# Patient Record
Sex: MALE | Race: BLACK OR AFRICAN AMERICAN | NOT HISPANIC OR LATINO | URBAN - METROPOLITAN AREA
[De-identification: names, ages, dates, MRNs, and addresses within clinical notes are randomized per-mention and may not be internally consistent; named-entity substitution may affect disease eponyms.]

---

## 2017-04-17 ENCOUNTER — INPATIENT (INPATIENT)
Facility: HOSPITAL | Age: 82
LOS: 6 days | Discharge: NURSING, REHAB OR LONG-TERM CARE | DRG: 56 | End: 2017-04-24
Attending: HOSPITALIST | Admitting: HOSPITALIST
Payer: MEDICARE

## 2017-04-17 VITALS
HEIGHT: 63 IN | HEART RATE: 116 BPM | RESPIRATION RATE: 18 BRPM | SYSTOLIC BLOOD PRESSURE: 133 MMHG | WEIGHT: 151.9 LBS | TEMPERATURE: 98 F | OXYGEN SATURATION: 96 % | DIASTOLIC BLOOD PRESSURE: 84 MMHG

## 2017-04-17 DIAGNOSIS — R41.82 ALTERED MENTAL STATUS, UNSPECIFIED: ICD-10-CM

## 2017-04-17 LAB
ALBUMIN SERPL ELPH-MCNC: 4.1 G/DL — SIGNIFICANT CHANGE UP (ref 3.3–5.2)
ALP SERPL-CCNC: 101 U/L — SIGNIFICANT CHANGE UP (ref 40–120)
ALT FLD-CCNC: 18 U/L — SIGNIFICANT CHANGE UP
ANION GAP SERPL CALC-SCNC: 18 MMOL/L — HIGH (ref 5–17)
APPEARANCE UR: CLEAR — SIGNIFICANT CHANGE UP
AST SERPL-CCNC: 21 U/L — SIGNIFICANT CHANGE UP
BACTERIA # UR AUTO: ABNORMAL
BASOPHILS # BLD AUTO: 0 K/UL — SIGNIFICANT CHANGE UP (ref 0–0.2)
BASOPHILS NFR BLD AUTO: 0 % — SIGNIFICANT CHANGE UP (ref 0–2)
BILIRUB SERPL-MCNC: 1.1 MG/DL — SIGNIFICANT CHANGE UP (ref 0.4–2)
BILIRUB UR-MCNC: NEGATIVE — SIGNIFICANT CHANGE UP
BUN SERPL-MCNC: 39 MG/DL — HIGH (ref 8–20)
CALCIUM SERPL-MCNC: 9.6 MG/DL — SIGNIFICANT CHANGE UP (ref 8.6–10.2)
CHLORIDE SERPL-SCNC: 94 MMOL/L — LOW (ref 98–107)
CO2 SERPL-SCNC: 23 MMOL/L — SIGNIFICANT CHANGE UP (ref 22–29)
COLOR SPEC: YELLOW — SIGNIFICANT CHANGE UP
CREAT SERPL-MCNC: 1.48 MG/DL — HIGH (ref 0.5–1.3)
DIFF PNL FLD: ABNORMAL
EOSINOPHIL # BLD AUTO: 0.2 K/UL — SIGNIFICANT CHANGE UP (ref 0–0.5)
EOSINOPHIL NFR BLD AUTO: 1 % — SIGNIFICANT CHANGE UP (ref 0–6)
EPI CELLS # UR: SIGNIFICANT CHANGE UP
GLUCOSE SERPL-MCNC: 217 MG/DL — HIGH (ref 70–115)
GLUCOSE UR QL: 1000 MG/DL
HCT VFR BLD CALC: 40.8 % — LOW (ref 42–52)
HGB BLD-MCNC: 14.9 G/DL — SIGNIFICANT CHANGE UP (ref 14–18)
KETONES UR-MCNC: ABNORMAL
LACTATE SERPL-SCNC: 1.4 MMOL/L — SIGNIFICANT CHANGE UP (ref 0.5–2)
LEUKOCYTE ESTERASE UR-ACNC: NEGATIVE — SIGNIFICANT CHANGE UP
LYMPHOCYTES # BLD AUTO: 3.4 K/UL — SIGNIFICANT CHANGE UP (ref 1–4.8)
LYMPHOCYTES # BLD AUTO: 30 % — SIGNIFICANT CHANGE UP (ref 20–55)
MCHC RBC-ENTMCNC: 30.7 PG — SIGNIFICANT CHANGE UP (ref 27–31)
MCHC RBC-ENTMCNC: 36.5 G/DL — HIGH (ref 32–36)
MCV RBC AUTO: 84.1 FL — SIGNIFICANT CHANGE UP (ref 80–94)
MONOCYTES # BLD AUTO: 0.8 K/UL — SIGNIFICANT CHANGE UP (ref 0–0.8)
MONOCYTES NFR BLD AUTO: 10 % — SIGNIFICANT CHANGE UP (ref 3–10)
NEUTROPHILS # BLD AUTO: 7.3 K/UL — SIGNIFICANT CHANGE UP (ref 1.8–8)
NEUTROPHILS NFR BLD AUTO: 59 % — SIGNIFICANT CHANGE UP (ref 37–73)
NITRITE UR-MCNC: NEGATIVE — SIGNIFICANT CHANGE UP
NT-PROBNP SERPL-SCNC: 21 PG/ML — SIGNIFICANT CHANGE UP (ref 0–300)
PH UR: 5 — SIGNIFICANT CHANGE UP (ref 4.8–8)
PLATELET # BLD AUTO: 317 K/UL — SIGNIFICANT CHANGE UP (ref 150–400)
POTASSIUM SERPL-MCNC: 5.8 MMOL/L — HIGH (ref 3.5–5.3)
POTASSIUM SERPL-SCNC: 5.8 MMOL/L — HIGH (ref 3.5–5.3)
PROT SERPL-MCNC: 8.6 G/DL — SIGNIFICANT CHANGE UP (ref 6.6–8.7)
PROT UR-MCNC: 15 MG/DL
RBC # BLD: 4.85 M/UL — SIGNIFICANT CHANGE UP (ref 4.6–6.2)
RBC # FLD: 13.2 % — SIGNIFICANT CHANGE UP (ref 11–15.6)
RBC CASTS # UR COMP ASSIST: ABNORMAL /HPF (ref 0–4)
SODIUM SERPL-SCNC: 135 MMOL/L — SIGNIFICANT CHANGE UP (ref 135–145)
SP GR SPEC: 1.01 — SIGNIFICANT CHANGE UP (ref 1.01–1.02)
TSH SERPL-MCNC: 0.79 UIU/ML — SIGNIFICANT CHANGE UP (ref 0.27–4.2)
UROBILINOGEN FLD QL: NEGATIVE MG/DL — SIGNIFICANT CHANGE UP
WBC # BLD: 11.8 K/UL — HIGH (ref 4.8–10.8)
WBC # FLD AUTO: 11.8 K/UL — HIGH (ref 4.8–10.8)
WBC UR QL: SIGNIFICANT CHANGE UP

## 2017-04-17 PROCEDURE — 71010: CPT | Mod: 26

## 2017-04-17 PROCEDURE — 70450 CT HEAD/BRAIN W/O DYE: CPT | Mod: 26

## 2017-04-17 PROCEDURE — 93010 ELECTROCARDIOGRAM REPORT: CPT

## 2017-04-17 PROCEDURE — 99223 1ST HOSP IP/OBS HIGH 75: CPT

## 2017-04-17 PROCEDURE — 99285 EMERGENCY DEPT VISIT HI MDM: CPT

## 2017-04-17 RX ORDER — RISPERIDONE 4 MG/1
1 TABLET ORAL
Qty: 0 | Refills: 0 | COMMUNITY

## 2017-04-17 RX ORDER — DEXTROSE 50 % IN WATER 50 %
12.5 SYRINGE (ML) INTRAVENOUS ONCE
Qty: 0 | Refills: 0 | Status: DISCONTINUED | OUTPATIENT
Start: 2017-04-17 | End: 2017-04-24

## 2017-04-17 RX ORDER — METFORMIN HYDROCHLORIDE 850 MG/1
1 TABLET ORAL
Qty: 0 | Refills: 0 | COMMUNITY

## 2017-04-17 RX ORDER — AMLODIPINE BESYLATE 2.5 MG/1
10 TABLET ORAL DAILY
Qty: 0 | Refills: 0 | Status: DISCONTINUED | OUTPATIENT
Start: 2017-04-17 | End: 2017-04-24

## 2017-04-17 RX ORDER — DEXTROSE 50 % IN WATER 50 %
25 SYRINGE (ML) INTRAVENOUS ONCE
Qty: 0 | Refills: 0 | Status: DISCONTINUED | OUTPATIENT
Start: 2017-04-17 | End: 2017-04-24

## 2017-04-17 RX ORDER — ASPIRIN/CALCIUM CARB/MAGNESIUM 324 MG
325 TABLET ORAL DAILY
Qty: 0 | Refills: 0 | Status: DISCONTINUED | OUTPATIENT
Start: 2017-04-17 | End: 2017-04-24

## 2017-04-17 RX ORDER — DONEPEZIL HYDROCHLORIDE 10 MG/1
10 TABLET, FILM COATED ORAL AT BEDTIME
Qty: 0 | Refills: 0 | Status: DISCONTINUED | OUTPATIENT
Start: 2017-04-17 | End: 2017-04-24

## 2017-04-17 RX ORDER — RISPERIDONE 4 MG/1
0.5 TABLET ORAL DAILY
Qty: 0 | Refills: 0 | Status: DISCONTINUED | OUTPATIENT
Start: 2017-04-17 | End: 2017-04-18

## 2017-04-17 RX ORDER — ATORVASTATIN CALCIUM 80 MG/1
1 TABLET, FILM COATED ORAL
Qty: 0 | Refills: 0 | COMMUNITY

## 2017-04-17 RX ORDER — SODIUM CHLORIDE 9 MG/ML
1000 INJECTION, SOLUTION INTRAVENOUS
Qty: 0 | Refills: 0 | Status: DISCONTINUED | OUTPATIENT
Start: 2017-04-17 | End: 2017-04-24

## 2017-04-17 RX ORDER — SODIUM CHLORIDE 9 MG/ML
1000 INJECTION INTRAMUSCULAR; INTRAVENOUS; SUBCUTANEOUS ONCE
Qty: 0 | Refills: 0 | Status: COMPLETED | OUTPATIENT
Start: 2017-04-17 | End: 2017-04-17

## 2017-04-17 RX ORDER — SODIUM CHLORIDE 9 MG/ML
1000 INJECTION INTRAMUSCULAR; INTRAVENOUS; SUBCUTANEOUS
Qty: 0 | Refills: 0 | Status: DISCONTINUED | OUTPATIENT
Start: 2017-04-17 | End: 2017-04-19

## 2017-04-17 RX ORDER — DONEPEZIL HYDROCHLORIDE 10 MG/1
1 TABLET, FILM COATED ORAL
Qty: 0 | Refills: 0 | COMMUNITY

## 2017-04-17 RX ORDER — TIMOLOL 0.5 %
1 DROPS OPHTHALMIC (EYE) EVERY 12 HOURS
Qty: 0 | Refills: 0 | Status: DISCONTINUED | OUTPATIENT
Start: 2017-04-17 | End: 2017-04-24

## 2017-04-17 RX ORDER — ASPIRIN/CALCIUM CARB/MAGNESIUM 324 MG
1 TABLET ORAL
Qty: 0 | Refills: 0 | COMMUNITY

## 2017-04-17 RX ORDER — AMLODIPINE BESYLATE 2.5 MG/1
1 TABLET ORAL
Qty: 0 | Refills: 0 | COMMUNITY

## 2017-04-17 RX ORDER — INSULIN LISPRO 100/ML
VIAL (ML) SUBCUTANEOUS
Qty: 0 | Refills: 0 | Status: DISCONTINUED | OUTPATIENT
Start: 2017-04-17 | End: 2017-04-24

## 2017-04-17 RX ORDER — DEXTROSE 50 % IN WATER 50 %
1 SYRINGE (ML) INTRAVENOUS ONCE
Qty: 0 | Refills: 0 | Status: DISCONTINUED | OUTPATIENT
Start: 2017-04-17 | End: 2017-04-24

## 2017-04-17 RX ORDER — LISINOPRIL 2.5 MG/1
1 TABLET ORAL
Qty: 0 | Refills: 0 | COMMUNITY

## 2017-04-17 RX ORDER — BRIMONIDINE TARTRATE, TIMOLOL MALEATE 2; 5 MG/ML; MG/ML
1 SOLUTION/ DROPS OPHTHALMIC
Qty: 0 | Refills: 0 | COMMUNITY

## 2017-04-17 RX ORDER — ATORVASTATIN CALCIUM 80 MG/1
20 TABLET, FILM COATED ORAL AT BEDTIME
Qty: 0 | Refills: 0 | Status: DISCONTINUED | OUTPATIENT
Start: 2017-04-17 | End: 2017-04-24

## 2017-04-17 RX ORDER — HEPARIN SODIUM 5000 [USP'U]/ML
5000 INJECTION INTRAVENOUS; SUBCUTANEOUS EVERY 8 HOURS
Qty: 0 | Refills: 0 | Status: DISCONTINUED | OUTPATIENT
Start: 2017-04-17 | End: 2017-04-24

## 2017-04-17 RX ORDER — GLUCAGON INJECTION, SOLUTION 0.5 MG/.1ML
1 INJECTION, SOLUTION SUBCUTANEOUS ONCE
Qty: 0 | Refills: 0 | Status: DISCONTINUED | OUTPATIENT
Start: 2017-04-17 | End: 2017-04-24

## 2017-04-17 RX ORDER — BRIMONIDINE TARTRATE 2 MG/MG
1 SOLUTION/ DROPS OPHTHALMIC EVERY 12 HOURS
Qty: 0 | Refills: 0 | Status: DISCONTINUED | OUTPATIENT
Start: 2017-04-17 | End: 2017-04-24

## 2017-04-17 RX ADMIN — SODIUM CHLORIDE 1000 MILLILITER(S): 9 INJECTION INTRAMUSCULAR; INTRAVENOUS; SUBCUTANEOUS at 19:10

## 2017-04-17 RX ADMIN — ATORVASTATIN CALCIUM 20 MILLIGRAM(S): 80 TABLET, FILM COATED ORAL at 22:16

## 2017-04-17 RX ADMIN — BRIMONIDINE TARTRATE 1 DROP(S): 2 SOLUTION/ DROPS OPHTHALMIC at 22:17

## 2017-04-17 RX ADMIN — HEPARIN SODIUM 5000 UNIT(S): 5000 INJECTION INTRAVENOUS; SUBCUTANEOUS at 22:16

## 2017-04-17 RX ADMIN — Medication 1 DROP(S): at 22:18

## 2017-04-17 RX ADMIN — Medication 3: at 22:17

## 2017-04-17 RX ADMIN — DONEPEZIL HYDROCHLORIDE 10 MILLIGRAM(S): 10 TABLET, FILM COATED ORAL at 22:16

## 2017-04-17 RX ADMIN — RISPERIDONE 0.5 MILLIGRAM(S): 4 TABLET ORAL at 22:16

## 2017-04-17 NOTE — H&P ADULT - NSHPSOCIALHISTORY_GEN_ALL_CORE
Retired. Worked in [employer].  Lives with son.  Former smoker - quit 15 years ago.  Stopped drinking alcohol 15 years ago.

## 2017-04-17 NOTE — ED PROVIDER NOTE - OBJECTIVE STATEMENT
The patient is a 81 year old male presenting to ED for worsening of mental status as per his son.  The patient is now hallucinating seeing his former  friends. No HA, No fever, No CP, NO SOB, No ABD pain, No motor nor sensory loss.

## 2017-04-17 NOTE — H&P ADULT - ASSESSMENT
73 years old male with PMH of DM, CVA, HTN and Dementia sent by PMD for evaluation of AMS.    1) Altered Mental State  - No signs of infection. Leukocytosis could be reactive. Cultures are sent. Will hold antibiotics at this time.  - AMS could be secondary to medications vs worsening dementia. Will get Psych eval.  2) Acute Kidney Injury  - Likely secondary to dehydration  - Hold Metformin and Lisinopril. Avoid Nephrotoxic medications.  - IVF  - Repeat labs in am  3) Hyperkalemia  - EKG  - Got IVF. Repeat Potassium.  4) Hematuria  - Repeat UA. will need further work up if persistent hematuria.  5) DM  - Hold Metformin and Glipizide  - Accu checks and RISS  6) CVA  - Continue Aspirin and Lipitor  - Optimal BP control   7) HTN  - Continue Amlodipine. Hold Lisinopril  8) HLD  - Continue Atorvastatin  DVT Prophylaxis -- Heparin 5000 units

## 2017-04-17 NOTE — ED PROVIDER NOTE - CARE PLAN
Principal Discharge DX:	Altered mental status, unspecified  Secondary Diagnosis:	Dementia  Secondary Diagnosis:	Diabetes

## 2017-04-17 NOTE — ED STATDOCS - PROGRESS NOTE DETAILS
82 y/o male, h/o HTN, DM, dementia, presenting w/ AMS x 4 days. Pt's son acting as historian. Denies fever, vomiting, diarrhea. Pt transferred to main ED for further care.

## 2017-04-17 NOTE — H&P ADULT - NSHPLABSRESULTS_GEN_ALL_CORE
LABS:                        14.9   11.8  )-----------( 317      ( 17 Apr 2017 16:48 )             40.8     04-17    x   |  x   |  x   ----------------------------<  x   5.0   |  x   |  x     Ca    9.6      17 Apr 2017 16:48    TPro  8.6  /  Alb  4.1  /  TBili  1.1  /  DBili  x   /  AST  21  /  ALT  18  /  AlkPhos  101  04-17      CARDIAC MARKERS ( 17 Apr 2017 16:48 )  x     / <0.01 ng/mL / x     / x     / x

## 2017-04-17 NOTE — ED PROVIDER NOTE - CHPI ED SYMPTOMS NEG
no dizziness/no vomiting/no loss of consciousness/no nausea/no fever/no numbness/no weakness/no blurred vision

## 2017-04-17 NOTE — H&P ADULT - HISTORY OF PRESENT ILLNESS
73 years old male with PMH of DM, CVA, HTN and Dementia sent by PMD for evaluation of AMS. As per family at bedside, patient has been confused since Friday. Has been talking about old  friends and is having visual hallucinations. He also fell on Saturday. Fall was unwitnessed but son found him on floor around 4 am. As per son, there was no focal deficit so they did not take him to any Urgent Care or ER.  His oral intake and sleep have decreased. Family denies any fever, cough, urinary symptoms or sick contacts.   Patient is alert and awake and denies any headache, speech problem, arm/leg weakness, fever, cough, chest pain, palpitations or urinary symptoms.

## 2017-04-17 NOTE — H&P ADULT - NSHPPHYSICALEXAM_GEN_ALL_CORE
Vital Signs   T(C): 36.6, Max: 36.6 (04-17 @ 13:13)  T(F): 97.9, Max: 97.9 (04-17 @ 13:13)  HR: 90 (90 - 116)  BP: 132/70 (132/70 - 133/84)  RR: 20 (18 - 20)  SpO2: 97% (96% - 97%)  General: Elderly male lying in bed comfortably. No acute distress  HEENT: PERRLA. EOMI. Clear conjunctivae. Dry mucus membrane. Healing small linear laceration on occipital region from recent fall.  Neck: Supple. No JVD. No Thyromegaly   Chest: CTA bilaterally - no wheezing, rales or rhonchi.   Heart: Normal S1 & S2 with RRR.   Abdomen: Soft. Non-tender. Non-distended. + BS  Ext: No pedal edema. No calf tenderness   Neuro: AAO x 2, No focal deficit, CN II-XII grossly WNL and no speech disorder. No Kernig or Brudzinski signs  Skin: Warm and Dry

## 2017-04-18 DIAGNOSIS — R41.82 ALTERED MENTAL STATUS, UNSPECIFIED: ICD-10-CM

## 2017-04-18 DIAGNOSIS — I63.9 CEREBRAL INFARCTION, UNSPECIFIED: ICD-10-CM

## 2017-04-18 DIAGNOSIS — F03.91 UNSPECIFIED DEMENTIA WITH BEHAVIORAL DISTURBANCE: ICD-10-CM

## 2017-04-18 DIAGNOSIS — R41.0 DISORIENTATION, UNSPECIFIED: ICD-10-CM

## 2017-04-18 DIAGNOSIS — F05 DELIRIUM DUE TO KNOWN PHYSIOLOGICAL CONDITION: ICD-10-CM

## 2017-04-18 DIAGNOSIS — I10 ESSENTIAL (PRIMARY) HYPERTENSION: ICD-10-CM

## 2017-04-18 DIAGNOSIS — E11.9 TYPE 2 DIABETES MELLITUS WITHOUT COMPLICATIONS: ICD-10-CM

## 2017-04-18 LAB
CULTURE RESULTS: NO GROWTH — SIGNIFICANT CHANGE UP
RPR SERPL-ACNC: SIGNIFICANT CHANGE UP
SPECIMEN SOURCE: SIGNIFICANT CHANGE UP

## 2017-04-18 PROCEDURE — 93880 EXTRACRANIAL BILAT STUDY: CPT | Mod: 26

## 2017-04-18 PROCEDURE — 99223 1ST HOSP IP/OBS HIGH 75: CPT

## 2017-04-18 RX ORDER — ACETAMINOPHEN 500 MG
650 TABLET ORAL EVERY 6 HOURS
Qty: 0 | Refills: 0 | Status: DISCONTINUED | OUTPATIENT
Start: 2017-04-18 | End: 2017-04-24

## 2017-04-18 RX ORDER — HALOPERIDOL DECANOATE 100 MG/ML
2 INJECTION INTRAMUSCULAR ONCE
Qty: 0 | Refills: 0 | Status: COMPLETED | OUTPATIENT
Start: 2017-04-18 | End: 2017-04-18

## 2017-04-18 RX ORDER — RISPERIDONE 4 MG/1
1 TABLET ORAL
Qty: 0 | Refills: 0 | Status: DISCONTINUED | OUTPATIENT
Start: 2017-04-18 | End: 2017-04-24

## 2017-04-18 RX ORDER — HALOPERIDOL DECANOATE 100 MG/ML
5 INJECTION INTRAMUSCULAR ONCE
Qty: 0 | Refills: 0 | Status: COMPLETED | OUTPATIENT
Start: 2017-04-18 | End: 2017-04-18

## 2017-04-18 RX ORDER — HALOPERIDOL DECANOATE 100 MG/ML
5 INJECTION INTRAMUSCULAR EVERY 6 HOURS
Qty: 0 | Refills: 0 | Status: DISCONTINUED | OUTPATIENT
Start: 2017-04-18 | End: 2017-04-24

## 2017-04-18 RX ADMIN — Medication 2: at 21:47

## 2017-04-18 RX ADMIN — SODIUM CHLORIDE 125 MILLILITER(S): 9 INJECTION INTRAMUSCULAR; INTRAVENOUS; SUBCUTANEOUS at 05:41

## 2017-04-18 RX ADMIN — RISPERIDONE 1 MILLIGRAM(S): 4 TABLET ORAL at 06:32

## 2017-04-18 RX ADMIN — ATORVASTATIN CALCIUM 20 MILLIGRAM(S): 80 TABLET, FILM COATED ORAL at 21:29

## 2017-04-18 RX ADMIN — HALOPERIDOL DECANOATE 5 MILLIGRAM(S): 100 INJECTION INTRAMUSCULAR at 13:10

## 2017-04-18 RX ADMIN — Medication 650 MILLIGRAM(S): at 01:03

## 2017-04-18 RX ADMIN — BRIMONIDINE TARTRATE 1 DROP(S): 2 SOLUTION/ DROPS OPHTHALMIC at 16:46

## 2017-04-18 RX ADMIN — HALOPERIDOL DECANOATE 5 MILLIGRAM(S): 100 INJECTION INTRAMUSCULAR at 08:21

## 2017-04-18 RX ADMIN — HALOPERIDOL DECANOATE 2 MILLIGRAM(S): 100 INJECTION INTRAMUSCULAR at 06:32

## 2017-04-18 RX ADMIN — HALOPERIDOL DECANOATE 5 MILLIGRAM(S): 100 INJECTION INTRAMUSCULAR at 09:32

## 2017-04-18 RX ADMIN — HEPARIN SODIUM 5000 UNIT(S): 5000 INJECTION INTRAVENOUS; SUBCUTANEOUS at 21:29

## 2017-04-18 RX ADMIN — AMLODIPINE BESYLATE 10 MILLIGRAM(S): 2.5 TABLET ORAL at 05:42

## 2017-04-18 RX ADMIN — Medication 1 DROP(S): at 16:47

## 2017-04-18 RX ADMIN — Medication 1 DROP(S): at 05:42

## 2017-04-18 RX ADMIN — DONEPEZIL HYDROCHLORIDE 10 MILLIGRAM(S): 10 TABLET, FILM COATED ORAL at 21:28

## 2017-04-18 RX ADMIN — HALOPERIDOL DECANOATE 5 MILLIGRAM(S): 100 INJECTION INTRAMUSCULAR at 22:23

## 2017-04-18 RX ADMIN — Medication 2: at 12:25

## 2017-04-18 RX ADMIN — Medication 650 MILLIGRAM(S): at 01:45

## 2017-04-18 RX ADMIN — HEPARIN SODIUM 5000 UNIT(S): 5000 INJECTION INTRAVENOUS; SUBCUTANEOUS at 05:42

## 2017-04-18 RX ADMIN — BRIMONIDINE TARTRATE 1 DROP(S): 2 SOLUTION/ DROPS OPHTHALMIC at 05:42

## 2017-04-18 NOTE — PROGRESS NOTE ADULT - SUBJECTIVE AND OBJECTIVE BOX
HPI:  73 years old male with PMH of DM, CVA, HTN and Dementia sent by PMD for evaluation of AMS. As per family at bedside, patient has been confused since Friday. Has been talking about old  friends and is having visual hallucinations. He also fell on Saturday. Fall was unwitnessed but son found him on floor around 4 am. As per son, there was no focal deficit so they did not take him to any Urgent Care or ER.  His oral intake and sleep have decreased. Family denies any fever, cough, urinary symptoms or sick contacts.   Patient is alert and awake and denies any headache, speech problem, arm/leg weakness, fever, cough, chest pain, palpitations or urinary symptoms. (2017 21:48)    Male  PAST MEDICAL & SURGICAL HISTORY:  CVA (cerebral vascular accident)  Hyperlipemia  Diabetes  Hypertension  Dementia  No significant past surgical history      REVIEW OF SYSTEMS      General:	obesity    Skin/Breast: normal  	  Ophthalmologic: refractive disorder, diabetic retinopathy.  	  ENMT:	normal    Respiratory and Thorax:normal  	  Cardiovascular:	hypertension,     Gastrointestinal:	normal    Genitourinary:	BPH.    Musculoskeletal:  normal	    Neurological: Old righ CVA, with encephalomalacia. 	    Psychiatric:  normal 	    Hematology/Lymphatics:	normal    Endocrine:	obesity, diabetes, hyperlipidemia     Allergic/Immunologic:	none.    MEDICATIONS  (STANDING):  insulin lispro (HumaLOG) corrective regimen sliding scale  SubCutaneous Before meals and at bedtime  dextrose 5%. 1000milliLiter(s) IV Continuous <Continuous>  dextrose 50% Injectable 12.5Gram(s) IV Push once  dextrose 50% Injectable 25Gram(s) IV Push once  dextrose 50% Injectable 25Gram(s) IV Push once  sodium chloride 0.9%. 1000milliLiter(s) IV Continuous <Continuous>  aspirin 325milliGRAM(s) Oral daily  atorvastatin 20milliGRAM(s) Oral at bedtime  donepezil 10milliGRAM(s) Oral at bedtime  amLODIPine   Tablet 10milliGRAM(s) Oral daily  heparin  Injectable 5000Unit(s) SubCutaneous every 8 hours  timolol 0.5% Solution 1Drop(s) Both EYES every 12 hours  brimonidine 0.2% Ophthalmic Solution 1Drop(s) Both EYES every 12 hours  risperiDONE   Tablet 1milliGRAM(s) Oral two times a day    MEDICATIONS  (PRN):  dextrose Gel 1Dose(s) Oral once PRN Blood Glucose LESS THAN 70 milliGRAM(s)/deciLiter  glucagon  Injectable 1milliGRAM(s) IntraMuscular once PRN Glucose <70 milliGRAM(s)/deciLiter  acetaminophen   Tablet. 650milliGRAM(s) Oral every 6 hours PRN Headache or Bodyache  haloperidol    Injectable 5milliGRAM(s) IntraMuscular every 6 hours PRN Agitation      Allergies    No Known Allergies    Intolerances        SOCIAL HISTORY:    FAMILY HISTORY:  No pertinent family history in first degree relatives      Vital Signs Last 24 Hrs  T(C): 37.4, Max: 37.4 (04-18 @ 19:16)  T(F): 99.3, Max: 99.3 (04-18 @ 19:16)  HR: 124 (89 - 124)  BP: 140/65 (120/79 - 153/84)  BP(mean): --  RR: 20 (19 - 24)  SpO2: 98% (98% - 100%)    PHYSICAL EXAM:      Constitutional: obesity, well developed,  confused  and with hallucination, aversive behavior.     Eyes: refraction disorder +  diabetic  retinopathy.    ENMT:  normal     Neck:  no bruits,  neck veins flat.    Breasts:  normal     Back:  normal     Respiratory: normal    Cardiovascular: regular rhythm, no murmur    Gastrointestinal:  normal     Genitourinary: BPH.    Rectal:    Extremities: no edema,     Vascular:  normal     Neurological: no neuro deficits    Skin:  normal     Lymph Nodes:  normal     Musculoskeletal: normal     Psychiatric:  hallucination, delirium on dementia. agitation, aggressive  behavior.        LABS:  CBC Full  -  ( 2017 16:48 )  WBC Count : 11.8 K/uL  Hemoglobin : 14.9 g/dL  Hematocrit : 40.8 %  Platelet Count - Automated : 317 K/uL  Mean Cell Volume : 84.1 fl  Mean Cell Hemoglobin : 30.7 pg  Mean Cell Hemoglobin Concentration : 36.5 g/dL  Auto Neutrophil # : 7.3 K/uL  Auto Lymphocyte # : 3.4 K/uL  Auto Monocyte # : 0.8 K/uL  Auto Eosinophil # : 0.2 K/uL  Auto Basophil # : 0.0 K/uL  Auto Neutrophil % : 59.0 %  Auto Lymphocyte % : 30.0 %  Auto Monocyte % : 10.0 %  Auto Eosinophil % : 1.0 %  Auto Basophil % : 0.0 %    04-17    x   |  x   |  x   ----------------------------<  x   5.0   |  x   |  x     Ca    9.6      2017 16:48    TPro  8.6  /  Alb  4.1  /  TBili  1.1  /  DBili  x   /  AST  21  /  ALT  18  /  AlkPhos  101  04-17      Urinalysis Basic - ( 2017 21:29 )    Color: Yellow / Appearance: Clear / S.015 / pH: x  Gluc: x / Ketone: Moderate  / Bili: Negative / Urobili: Negative mg/dL   Blood: x / Protein: 15 mg/dL / Nitrite: Negative   Leuk Esterase: Negative / RBC: 6-10 /HPF / WBC 0-2   Sq Epi: x / Non Sq Epi: Few / Bacteria: Few        RADIOLOGY & ADDITIONAL STUDIES:  CT SCAN of the brain, old rigt CVA, no additional patholgy.  ECHO LVH

## 2017-04-18 NOTE — CONSULT NOTE ADULT - SUBJECTIVE AND OBJECTIVE BOX
AMS    73M with AMS, confusion, hx of CVA, R/O syncope.  Pt is unable to give hx at this time    MEDICATIONS  (STANDING):  insulin lispro (HumaLOG) corrective regimen sliding scale  SubCutaneous Before meals and at bedtime  dextrose 5%. 1000milliLiter(s) IV Continuous <Continuous>  dextrose 50% Injectable 12.5Gram(s) IV Push once  dextrose 50% Injectable 25Gram(s) IV Push once  dextrose 50% Injectable 25Gram(s) IV Push once  sodium chloride 0.9%. 1000milliLiter(s) IV Continuous <Continuous>  aspirin 325milliGRAM(s) Oral daily  atorvastatin 20milliGRAM(s) Oral at bedtime  donepezil 10milliGRAM(s) Oral at bedtime  amLODIPine   Tablet 10milliGRAM(s) Oral daily  heparin  Injectable 5000Unit(s) SubCutaneous every 8 hours  timolol 0.5% Solution 1Drop(s) Both EYES every 12 hours  brimonidine 0.2% Ophthalmic Solution 1Drop(s) Both EYES every 12 hours  risperiDONE   Tablet 1milliGRAM(s) Oral two times a day    MEDICATIONS  (PRN):  dextrose Gel 1Dose(s) Oral once PRN Blood Glucose LESS THAN 70 milliGRAM(s)/deciLiter  glucagon  Injectable 1milliGRAM(s) IntraMuscular once PRN Glucose <70 milliGRAM(s)/deciLiter  acetaminophen   Tablet. 650milliGRAM(s) Oral every 6 hours PRN Headache or Bodyache  haloperidol    Injectable 5milliGRAM(s) IntraMuscular every 6 hours PRN Agitation  	    Vital Signs Last 24 Hrs  T(C): 36.3, Max: 36.8 (-18 @ 02:51)  T(F): 97.3, Max: 98.2 (04-18 @ 02:51)  HR: 106 (89 - 116)  BP: 153/84 (132/70 - 153/84)  BP(mean): --  RR: 24 (18 - 24)  SpO2: 98% (96% - 98%)    Daily Height in cm: 160.02 (2017 13:13)    Daily     I&O's Detail      PHYSICAL EXAM:  Appearance: Normal, well nourished		  Cardiovascular: Normal S1 S2, No JVD, No cardiac murmurs, No carotid bruits, No peripheral edema  Respiratory: Lungs clear to auscultation	  Gastrointestinal:  Soft, Non-tender, + BS, no bruits	  Skin: No rashes, No ecchymoses, No cyanosis  Neurologic: right arm weakness  Extremities:No clubbing, cyanosis or edema  Vascular: Peripheral pulses palpable 2+ bilaterally      INTERPRETATION OF TELEMETRY:    ECG: SR no acute st/t abn    LABS:                        14.9   11.8  )-----------( 317      ( 2017 16:48 )             40.8         x   |  x   |  x   ----------------------------<  x   5.0   |  x   |  x     Ca    9.6      2017 16:48    TPro  8.6  /  Alb  4.1  /  TBili  1.1  /  DBili  x   /  AST  21  /  ALT  18  /  AlkPhos  101  -    CARDIAC MARKERS ( 2017 16:48 )  x     / <0.01 ng/mL / x     / x     / x            Urinalysis Basic - ( 2017 21:29 )    Color: Yellow / Appearance: Clear / S.015 / pH: x  Gluc: x / Ketone: Moderate  / Bili: Negative / Urobili: Negative mg/dL   Blood: x / Protein: 15 mg/dL / Nitrite: Negative   Leuk Esterase: Negative / RBC: 6-10 /HPF / WBC 0-2   Sq Epi: x / Non Sq Epi: Few / Bacteria: Few      I&O's Summary    BNPSerum Pro-Brain Natriuretic Peptide: 21 pg/mL ( @ 16:48)    RADIOLOGY & ADDITIONAL STUDIES:    Impression.  AMS, etiology unclear.  Hx of CVA, R/O syncope. CT of head is c/w old infarct.   Suggest neuro eval, tele, Echo  Will follow, thank you.

## 2017-04-18 NOTE — CONSULT NOTE ADULT - SUBJECTIVE AND OBJECTIVE BOX
CHIEF COMPLAINT: altered mentation    HPI: 81yMale  73 years old male with PMH of DM, CVA, HTN and Dementia sent by PMD for evaluation of AMS. As per family at bedside, patient has been confused since Friday. Has been talking about old  friends and is having visual hallucinations. He also fell on Saturday. Fall was unwitnessed but son found him on floor around 4 am. As per son, there was no focal deficit so they did not take him to any Urgent Care or ER.  His oral intake and sleep have decreased. Family denies any fever, cough, urinary symptoms or sick contacts.   Patient is alert and awake and denies any headache, speech problem, arm/leg weakness, fever, cough, chest pain, palpitations or urinary symptoms.    Patient speaks limited English. Speaking Creole.         PAST MEDICAL & SURGICAL HISTORY:  CVA (cerebral vascular accident)  Hyperlipemia  Diabetes  Hypertension  Dementia  No significant past surgical history    MEDICATIONS  (STANDING):  insulin lispro (HumaLOG) corrective regimen sliding scale  SubCutaneous Before meals and at bedtime  dextrose 5%. 1000milliLiter(s) IV Continuous <Continuous>  dextrose 50% Injectable 12.5Gram(s) IV Push once  dextrose 50% Injectable 25Gram(s) IV Push once  dextrose 50% Injectable 25Gram(s) IV Push once  sodium chloride 0.9%. 1000milliLiter(s) IV Continuous <Continuous>  aspirin 325milliGRAM(s) Oral daily  atorvastatin 20milliGRAM(s) Oral at bedtime  donepezil 10milliGRAM(s) Oral at bedtime  amLODIPine   Tablet 10milliGRAM(s) Oral daily  heparin  Injectable 5000Unit(s) SubCutaneous every 8 hours  timolol 0.5% Solution 1Drop(s) Both EYES every 12 hours  brimonidine 0.2% Ophthalmic Solution 1Drop(s) Both EYES every 12 hours  risperiDONE   Tablet 1milliGRAM(s) Oral two times a day  haloperidol    Injectable 5milliGRAM(s) IV Push once    MEDICATIONS  (PRN):  dextrose Gel 1Dose(s) Oral once PRN Blood Glucose LESS THAN 70 milliGRAM(s)/deciLiter  glucagon  Injectable 1milliGRAM(s) IntraMuscular once PRN Glucose <70 milliGRAM(s)/deciLiter  acetaminophen   Tablet. 650milliGRAM(s) Oral every 6 hours PRN Headache or Bodyache  haloperidol    Injectable 5milliGRAM(s) IntraMuscular every 6 hours PRN Agitation    Allergies    No Known Allergies    Intolerances        FAMILY HISTORY:  No pertinent family history in first degree relatives          SOCIAL HISTORY:    Tobacco:  no  Alcohol:  no  Drugs:  no        REVIEW OF SYSTEMS:    Relevant systems are negative except as noted in the chart, HPI, and PMH      VITAL SIGNS:  Vital Signs Last 24 Hrs  T(C): 36.3, Max: 36.8 (04-18 @ 02:51)  T(F): 97.3, Max: 98.2 (04-18 @ 02:51)  HR: 106 (89 - 116)  BP: 153/84 (132/70 - 153/84)  BP(mean): --  RR: 24 (18 - 24)  SpO2: 98% (96% - 98%)    PHYSICAL EXAMINATION:    General: Well-developed, well nourished, in no acute distress.  Cardiac:  Regular rate and rhythm. No carotid bruits appreciated.  Eyes: Fundoscopic examination was deferred.  Neurologic:  - Mental Status:  Alert, awake, Agitated/variably cooperative. Speaking Creole but follow some commands in English-variable  Cranial Nerves II-XII:    II:   ; Visual fields are full to threat Pupils are equal, round, and reactive to light.  No gross facial asymmetry  - Motor:  Strength- moving all 4 limbs but seems to move the left side less.   - Reflexes:  2+   knees.  Plantar responses flexor on right . Nonreactive on left  -     LABS:                          14.9   11.8  )-----------( 317      ( 17 Apr 2017 16:48 )             40.8     17 Apr 2017 22:38    x      |  x      |  x      ----------------------------<  x      5.0     |  x      |  x        Ca    9.6        17 Apr 2017 16:48    TPro  8.6    /  Alb  4.1    /  TBili  1.1    /  DBili  x      /  AST  21     /  ALT  18     /  AlkPhos  101    17 Apr 2017 16:48    LIVER FUNCTIONS - ( 17 Apr 2017 16:48 )  Alb: 4.1 g/dL / Pro: 8.6 g/dL / ALK PHOS: 101 U/L / ALT: 18 U/L / AST: 21 U/L / GGT: x                 RADIOLOGY & ADDITIONAL STUDIES:      CT 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 No acute changes    IMPRESSION:    encephalopathy- some metabolic derangments, r/o infections  h/o dementia  Exam suggestive of left side weakness. CT old stroke. Multiple risk factors for cerebra infarct- r/o CVA    PLAN:  1. Stroke protocols  2. Medical managment  3. Vascular risk facots assessment and interventions  4. MRI, vascular imaging,  Cardiac eval  5.Dispo  6. Dementia eval- labs, Aricept

## 2017-04-18 NOTE — PROGRESS NOTE ADULT - ASSESSMENT
acute delirium on chronic  dementia.  old cva, no  indication of new stroke, possible encephalopathy

## 2017-04-18 NOTE — BEHAVIORAL HEALTH ASSESSMENT NOTE - HPI (INCLUDE ILLNESS QUALITY, SEVERITY, DURATION, TIMING, CONTEXT, MODIFYING FACTORS, ASSOCIATED SIGNS AND SYMPTOMS)
80 yo male w/hx of dementia for 2 yrs, no psychiatric hospitalizations or other issues, pmh DM, CVA, HTN, lives with son, no abuse/trauma/legal hx/substance abuse, BIB EMS for acute AMS with confusion that started 5 days ago. The patient is confused, a&ox1, with poor concentration, in posey vest, had rec'd haldol 12mg IM between 7am and 9am with minimal effect. Son reports that patient has had poor intake of food/drink for the past few week due to decreased appetite. He first noticed worsening confusion last thursday but denies any agitation. The patient has been on risperidone 1mg bid for 2 years. He was found to have metabolic irregularities including elevated BUN and creatinine. IV fluids have been ordered but this patient continues to pull at/out IV. Also, refusing insulin and other medications.

## 2017-04-19 DIAGNOSIS — N17.9 ACUTE KIDNEY FAILURE, UNSPECIFIED: ICD-10-CM

## 2017-04-19 DIAGNOSIS — I10 ESSENTIAL (PRIMARY) HYPERTENSION: ICD-10-CM

## 2017-04-19 LAB
ANION GAP SERPL CALC-SCNC: 13 MMOL/L — SIGNIFICANT CHANGE UP (ref 5–17)
B BURGDOR C6 AB SER-ACNC: NEGATIVE — SIGNIFICANT CHANGE UP
B BURGDOR IGG+IGM SER-ACNC: 0.02 INDEX — SIGNIFICANT CHANGE UP (ref 0.01–0.89)
BUN SERPL-MCNC: 30 MG/DL — HIGH (ref 8–20)
CALCIUM SERPL-MCNC: 9.5 MG/DL — SIGNIFICANT CHANGE UP (ref 8.6–10.2)
CHLORIDE SERPL-SCNC: 96 MMOL/L — LOW (ref 98–107)
CHOLEST SERPL-MCNC: 113 MG/DL — SIGNIFICANT CHANGE UP (ref 110–199)
CO2 SERPL-SCNC: 23 MMOL/L — SIGNIFICANT CHANGE UP (ref 22–29)
CREAT SERPL-MCNC: 0.8 MG/DL — SIGNIFICANT CHANGE UP (ref 0.5–1.3)
GLUCOSE SERPL-MCNC: 193 MG/DL — HIGH (ref 70–115)
HBA1C BLD-MCNC: 10.2 % — HIGH (ref 4–5.6)
HCT VFR BLD CALC: 38.4 % — LOW (ref 42–52)
HDLC SERPL-MCNC: 57 MG/DL — SIGNIFICANT CHANGE UP
HGB BLD-MCNC: 14.1 G/DL — SIGNIFICANT CHANGE UP (ref 14–18)
LIPID PNL WITH DIRECT LDL SERPL: 38 MG/DL — SIGNIFICANT CHANGE UP
MAGNESIUM SERPL-MCNC: 2 MG/DL — SIGNIFICANT CHANGE UP (ref 1.8–2.5)
MCHC RBC-ENTMCNC: 30.5 PG — SIGNIFICANT CHANGE UP (ref 27–31)
MCHC RBC-ENTMCNC: 36.7 G/DL — HIGH (ref 32–36)
MCV RBC AUTO: 83.1 FL — SIGNIFICANT CHANGE UP (ref 80–94)
PHOSPHATE SERPL-MCNC: 3.3 MG/DL — SIGNIFICANT CHANGE UP (ref 2.4–4.7)
PLATELET # BLD AUTO: 254 K/UL — SIGNIFICANT CHANGE UP (ref 150–400)
POTASSIUM SERPL-MCNC: 4.7 MMOL/L — SIGNIFICANT CHANGE UP (ref 3.5–5.3)
POTASSIUM SERPL-SCNC: 4.7 MMOL/L — SIGNIFICANT CHANGE UP (ref 3.5–5.3)
RBC # BLD: 4.62 M/UL — SIGNIFICANT CHANGE UP (ref 4.6–6.2)
RBC # FLD: 13.1 % — SIGNIFICANT CHANGE UP (ref 11–15.6)
SODIUM SERPL-SCNC: 132 MMOL/L — LOW (ref 135–145)
TOTAL CHOLESTEROL/HDL RATIO MEASUREMENT: 2 RATIO — LOW (ref 3.4–9.6)
TRIGL SERPL-MCNC: 88 MG/DL — SIGNIFICANT CHANGE UP (ref 10–200)
WBC # BLD: 14.3 K/UL — HIGH (ref 4.8–10.8)
WBC # FLD AUTO: 14.3 K/UL — HIGH (ref 4.8–10.8)

## 2017-04-19 PROCEDURE — 99233 SBSQ HOSP IP/OBS HIGH 50: CPT

## 2017-04-19 PROCEDURE — 70551 MRI BRAIN STEM W/O DYE: CPT | Mod: 26

## 2017-04-19 RX ADMIN — Medication 1 DROP(S): at 06:04

## 2017-04-19 RX ADMIN — Medication 1: at 22:44

## 2017-04-19 RX ADMIN — Medication 1 DROP(S): at 18:00

## 2017-04-19 RX ADMIN — HEPARIN SODIUM 5000 UNIT(S): 5000 INJECTION INTRAVENOUS; SUBCUTANEOUS at 06:03

## 2017-04-19 RX ADMIN — HEPARIN SODIUM 5000 UNIT(S): 5000 INJECTION INTRAVENOUS; SUBCUTANEOUS at 22:44

## 2017-04-19 RX ADMIN — RISPERIDONE 1 MILLIGRAM(S): 4 TABLET ORAL at 20:05

## 2017-04-19 RX ADMIN — AMLODIPINE BESYLATE 10 MILLIGRAM(S): 2.5 TABLET ORAL at 06:03

## 2017-04-19 RX ADMIN — BRIMONIDINE TARTRATE 1 DROP(S): 2 SOLUTION/ DROPS OPHTHALMIC at 18:00

## 2017-04-19 RX ADMIN — BRIMONIDINE TARTRATE 1 DROP(S): 2 SOLUTION/ DROPS OPHTHALMIC at 06:04

## 2017-04-19 RX ADMIN — Medication 1 MILLIGRAM(S): at 10:19

## 2017-04-19 RX ADMIN — Medication 5: at 12:21

## 2017-04-19 RX ADMIN — HEPARIN SODIUM 5000 UNIT(S): 5000 INJECTION INTRAVENOUS; SUBCUTANEOUS at 12:26

## 2017-04-19 RX ADMIN — RISPERIDONE 1 MILLIGRAM(S): 4 TABLET ORAL at 06:03

## 2017-04-19 NOTE — CONSULT NOTE ADULT - ASSESSMENT
Acute CVA. Hx of CVA, possible LOC.  W/U is negative for etiology thus far.  Echocardiogram in WNL, no significant disease in the Carotid.  Further w/u as per Neuro.  Telemetry.

## 2017-04-19 NOTE — PROGRESS NOTE ADULT - PROBLEM SELECTOR PLAN 3
Hypotensive this afternoon likely secondary to Ativan given prior to MRI.  Continue Norvasc with parameters. Hypotensive this afternoon likely secondary to Ativan given prior to MRI.  Continue Norvasc with parameters.  on ivfs

## 2017-04-19 NOTE — DISCHARGE NOTE ADULT - MEDICATION SUMMARY - MEDICATIONS TO TAKE
I will START or STAY ON the medications listed below when I get home from the hospital:    aspirin 325 mg oral tablet  -- 1 tab(s) by mouth once a day  -- Indication: For Cerebrovascular accident (CVA), unspecified mechanism    lisinopril 10 mg oral tablet  -- 1 tab(s) by mouth once a day  -- Indication: For Essential hypertension    metFORMIN 1000 mg oral tablet  -- 1 tab(s) by mouth 2 times a day  -- Indication: For Type 2 diabetes mellitus with other circulatory complication    glipiZIDE 5 mg oral tablet  -- 3 tab(s) by mouth once a day  -- Indication: For Type 2 diabetes mellitus with other circulatory complication    atorvastatin 20 mg oral tablet  -- 1 tab(s) by mouth once a day  -- Indication: For Hyperlipemia    RisperDAL 0.5 mg oral tablet  -- 1 tab(s) by mouth once a day  -- Indication: For Dementia    amLODIPine 5 mg oral tablet  -- 1 tab(s) by mouth once a day  -- Indication: For Essential hypertension    Aricept 10 mg oral tablet  -- 1 tab(s) by mouth once a day (at bedtime)  -- Indication: For Dementia    Combigan 0.2%-0.5% ophthalmic solution  -- 1 drop(s) to each affected eye every 12 hours  -- Indication: For Eye condition I will START or STAY ON the medications listed below when I get home from the hospital:    aspirin 325 mg oral tablet  -- 1 tab(s) by mouth once a day  -- Indication: For Cerebrovascular accident (CVA), unspecified mechanism    lisinopril 10 mg oral tablet  -- 1 tab(s) by mouth once a day  -- Indication: For Essential hypertension    metFORMIN 1000 mg oral tablet  -- 1 tab(s) by mouth 2 times a day  -- Indication: For Diabetes    glipiZIDE 5 mg oral tablet  -- 3 tab(s) by mouth once a day  -- Indication: For Diabetes    atorvastatin 20 mg oral tablet  -- 1 tab(s) by mouth once a day  -- Indication: For Hyperlipemia    RisperDAL 0.5 mg oral tablet  -- 1 tab(s) by mouth once a day  -- Indication: For Delirium    amLODIPine 5 mg oral tablet  -- 1 tab(s) by mouth once a day  -- Indication: For Essential hypertension    Aricept 10 mg oral tablet  -- 1 tab(s) by mouth once a day (at bedtime)  -- Indication: For Dementia    Combigan 0.2%-0.5% ophthalmic solution  -- 1 drop(s) to each affected eye every 12 hours  -- Indication: For Eye condition

## 2017-04-19 NOTE — DISCHARGE NOTE ADULT - NS AS DC STROKE ED MATERIALS
Stroke Warning Signs and Symptoms/Prescribed Medications/Call 911 for Stroke/Need for Followup After Discharge/Stroke Education Booklet/Risk Factors for Stroke

## 2017-04-19 NOTE — PROGRESS NOTE ADULT - SUBJECTIVE AND OBJECTIVE BOX
INTERVAL HISTORY:  patient denies any issues. Occasionally agitated- needing one to one supervision      VITAL SIGNS:  Vital Signs Last 24 Hrs  T(C): 36.1, Max: 37.4 (04-18 @ 19:16)  T(F): 97, Max: 99.3 (04-18 @ 19:16)  HR: 100 (98 - 124)  BP: 117/79 (117/76 - 169/93)  BP(mean): --  RR: 20 (19 - 20)  SpO2: 98% (98% - 100%)    PHYSICAL EXAMINATION:    Mentation:  awake, fairly cooperative speaking some English  Language/Speech:above  CN: PERRL , perhaps slight rightward deviation of eyes  Visual Fields: full to threat  Motor: moving left side less - (3/5)  Sensory: deferred  DTR:  Babinski: NR      MEDS:  MEDICATIONS  (STANDING):  insulin lispro (HumaLOG) corrective regimen sliding scale  SubCutaneous Before meals and at bedtime  dextrose 5%. 1000milliLiter(s) IV Continuous <Continuous>  dextrose 50% Injectable 12.5Gram(s) IV Push once  dextrose 50% Injectable 25Gram(s) IV Push once  dextrose 50% Injectable 25Gram(s) IV Push once  sodium chloride 0.9%. 1000milliLiter(s) IV Continuous <Continuous>  aspirin 325milliGRAM(s) Oral daily  atorvastatin 20milliGRAM(s) Oral at bedtime  donepezil 10milliGRAM(s) Oral at bedtime  amLODIPine   Tablet 10milliGRAM(s) Oral daily  heparin  Injectable 5000Unit(s) SubCutaneous every 8 hours  timolol 0.5% Solution 1Drop(s) Both EYES every 12 hours  brimonidine 0.2% Ophthalmic Solution 1Drop(s) Both EYES every 12 hours  risperiDONE   Tablet 1milliGRAM(s) Oral two times a day    MEDICATIONS  (PRN):  dextrose Gel 1Dose(s) Oral once PRN Blood Glucose LESS THAN 70 milliGRAM(s)/deciLiter  glucagon  Injectable 1milliGRAM(s) IntraMuscular once PRN Glucose <70 milliGRAM(s)/deciLiter  acetaminophen   Tablet. 650milliGRAM(s) Oral every 6 hours PRN Headache or Bodyache  haloperidol    Injectable 5milliGRAM(s) IntraMuscular every 6 hours PRN Agitation      LABS:                          14.9   11.8  )-----------( 317      ( 17 Apr 2017 16:48 )             40.8     04-17    x   |  x   |  x   ----------------------------<  x   5.0   |  x   |  x     Ca    9.6      17 Apr 2017 16:48    TPro  8.6  /  Alb  4.1  /  TBili  1.1  /  DBili  x   /  AST  21  /  ALT  18  /  AlkPhos  101  04-17    LIVER FUNCTIONS - ( 17 Apr 2017 16:48 )  Alb: 4.1 g/dL / Pro: 8.6 g/dL / ALK PHOS: 101 U/L / ALT: 18 U/L / AST: 21 U/L / GGT: x               RADIOLOGY & ADDITIONAL STUDIES:    duplex- neg  Echo, neg, limited  MRI pending    IMPRESSION & PLAN:   Patient with left side weakness that is more apparent today. Suspect cerebral infarct  Awaiting MRI. He may require sedation. Will try to expedite   Continue stroke protocols  Cerebrovascular risk factor management

## 2017-04-19 NOTE — CONSULT NOTE ADULT - SUBJECTIVE AND OBJECTIVE BOX
81M with altered mental status.  Poor historian.  Denies CP, palpitation, syncope  Hx of CVA        MEDICATIONS  (STANDING):  insulin lispro (HumaLOG) corrective regimen sliding scale  SubCutaneous Before meals and at bedtime  dextrose 5%. 1000milliLiter(s) IV Continuous <Continuous>  dextrose 50% Injectable 12.5Gram(s) IV Push once  dextrose 50% Injectable 25Gram(s) IV Push once  dextrose 50% Injectable 25Gram(s) IV Push once  sodium chloride 0.9%. 1000milliLiter(s) IV Continuous <Continuous>  aspirin 325milliGRAM(s) Oral daily  atorvastatin 20milliGRAM(s) Oral at bedtime  donepezil 10milliGRAM(s) Oral at bedtime  amLODIPine   Tablet 10milliGRAM(s) Oral daily  heparin  Injectable 5000Unit(s) SubCutaneous every 8 hours  timolol 0.5% Solution 1Drop(s) Both EYES every 12 hours  brimonidine 0.2% Ophthalmic Solution 1Drop(s) Both EYES every 12 hours  risperiDONE   Tablet 1milliGRAM(s) Oral two times a day  LORazepam   Injectable 1milliGRAM(s) IV Push once    MEDICATIONS  (PRN):  dextrose Gel 1Dose(s) Oral once PRN Blood Glucose LESS THAN 70 milliGRAM(s)/deciLiter  glucagon  Injectable 1milliGRAM(s) IntraMuscular once PRN Glucose <70 milliGRAM(s)/deciLiter  acetaminophen   Tablet. 650milliGRAM(s) Oral every 6 hours PRN Headache or Bodyache  haloperidol    Injectable 5milliGRAM(s) IntraMuscular every 6 hours PRN Agitation      SOCIAL HISTORY:    CIGARETTES:  former smoker      REVIEW OF SYSTEMS:  CONSTITUTIONAL: No fever, weight loss, or fatigue  EYES: No eye pain, visual disturbances, or discharge  ENMT:  No difficulty hearing, tinnitus, vertigo; No sinus or throat pain  NECK: No pain or stiffness  RESPIRATORY: No cough, wheezing, chills or hemoptysis; No Shortness of Breath  CARDIOVASCULAR: No chest pain, palpitations, passing out, dizziness, or leg swelling  GASTROINTESTINAL: No abdominal or epigastric pain. No nausea, vomiting, or hematemesis; No diarrhea or constipation. No melena or hematochezia.  GENITOURINARY: No dysuria, frequency, hematuria, or incontinence  NEUROLOGICAL: No headaches, memory loss, loss of strength, numbness, or tremors  SKIN: No itching, burning, rashes, or lesions   LYMPH Nodes: No enlarged glands  ENDOCRINE: No heat or cold intolerance; No hair loss  MUSCULOSKELETAL: No joint pain or swelling; No muscle, back, or extremity pain  	    Vital Signs Last 24 Hrs  T(C): 36.1, Max: 37.4 ( @ 19:16)  T(F): 97, Max: 99.3 ( @ 19:16)  HR: 100 (98 - 124)  BP: 117/79 (117/76 - 169/93)  BP(mean): --  RR: 20 (19 - 20)  SpO2: 98% (98% - 100%)    Daily     Daily     I&O's Detail      PHYSICAL EXAM:  Appearance: Normal, well nourished		  Cardiovascular: Normal S1 S2, No JVD, No cardiac murmurs, No carotid bruits, No peripheral edema  Respiratory: Lungs clear to auscultation	  Gastrointestinal:  Soft, Non-tender, + BS, no bruits	  Skin: No rashes, No ecchymoses, No cyanosis  Neurologic: Grossly non-focal with full strength in all four extremities  Extremities: Normal range of motion, No clubbing, cyanosis or edema  Vascular: Peripheral pulses palpable 2+ bilaterally      INTERPRETATION OF TELEMETRY:    ECG: SR no acute st/t abn    LABS:                        14.9   11.8  )-----------( 317      ( 2017 16:48 )             40.8     04-17    x   |  x   |  x   ----------------------------<  x   5.0   |  x   |  x     Ca    9.6      2017 16:48    TPro  8.6  /  Alb  4.1  /  TBili  1.1  /  DBili  x   /  AST  21  /  ALT  18  /  AlkPhos  101  04-17    CARDIAC MARKERS ( 2017 16:48 )  x     / <0.01 ng/mL / x     / x     / x            Urinalysis Basic - ( 2017 21:29 )    Color: Yellow / Appearance: Clear / S.015 / pH: x  Gluc: x / Ketone: Moderate  / Bili: Negative / Urobili: Negative mg/dL   Blood: x / Protein: 15 mg/dL / Nitrite: Negative   Leuk Esterase: Negative / RBC: 6-10 /HPF / WBC 0-2   Sq Epi: x / Non Sq Epi: Few / Bacteria: Few      I&O's Summary    BNP  RADIOLOGY & ADDITIONAL STUDIES:

## 2017-04-19 NOTE — DISCHARGE NOTE ADULT - PATIENT PORTAL LINK FT
“You can access the [facility] Patient Portal, offered by [facility], by registering with the following website: [email or web address]”

## 2017-04-19 NOTE — PROGRESS NOTE ADULT - SUBJECTIVE AND OBJECTIVE BOX
Patient transferred to Hospitalist service.    HPI:  73 years old male with PMH of DM, CVA, HTN and Dementia sent by PMD for evaluation of AMS. As per chart, family reported that patient had been confused since Friday. Patient was talking about old  friends and having visual hallucinations. He also fell on Saturday. Fall was unwitnessed but son found him on floor around 4 am. CT negative.  MRI showing acute punctate right temporal lobe infarct.      INTERVAL HPI/OVERNIGHT EVENTS: Patient seen and examined in ER.  Patient denies any pain.  1:1 at bedside reports that patient has been sleeping.  ROS limited secondary mental status.    Vital Signs Last 24 Hrs  T(C): 37.1, Max: 37.4 (-18 @ 19:16)  T(F): 98.8, Max: 99.3 (-18 @ 19:16)  HR: 88 (88 - 124)  BP: 97/66 (97/66 - 169/93)  BP(mean): --  RR: 20 (19 - 20)  SpO2: 98% (98% - 98%)  I&O's Detail      PHYSICAL EXAM:  GENERAL: NAD  HEAD:  Atraumatic, Normocephalic  EYES: EOMI, PERRLA, conjunctiva and sclera clear  ENMT: No tonsillar erythema, exudates, or enlargement; Moist mucous membranes, Good dentition, No lesions  NECK: Supple, No JVD, Normal thyroid  NERVOUS SYSTEM:  Alert to tactile stimuli, Bilateral arms with contractures; Left sided weakness  CHEST/LUNG: Clear to auscultation bilaterally; No rales, rhonchi, wheezing, or rubs  HEART: Regular rate and rhythm; No murmurs, rubs, or gallops  ABDOMEN: Soft, Nontender, distended; Bowel sounds present  EXTREMITIES:  2+ Peripheral Pulses, No clubbing, cyanosis, or edema  SKIN: No rashes or lesions      CARDIAC MARKERS ( 2017 16:48 )  x     / <0.01 ng/mL / x     / x     / x                            14.9   11.8  )-----------( 317      ( 2017 16:48 )             40.8     2017 22:38    x      |  x      |  x      ----------------------------<  x      5.0     |  x      |  x        Ca    9.6        2017 16:48    TPro  8.6    /  Alb  4.1    /  TBili  1.1    /  DBili  x      /  AST  21     /  ALT  18     /  AlkPhos  101    2017 16:48      CAPILLARY BLOOD GLUCOSE  320 (2017 12:11)  174 (2017 08:48)  247 (2017 21:41)  168 (2017 16:50)    LIVER FUNCTIONS - ( 2017 16:48 )  Alb: 4.1 g/dL / Pro: 8.6 g/dL / ALK PHOS: 101 U/L / ALT: 18 U/L / AST: 21 U/L / GGT: x           Urinalysis Basic - ( 2017 21:29 )    Color: Yellow / Appearance: Clear / S.015 / pH: x  Gluc: x / Ketone: Moderate  / Bili: Negative / Urobili: Negative mg/dL   Blood: x / Protein: 15 mg/dL / Nitrite: Negative   Leuk Esterase: Negative / RBC: 6-10 /HPF / WBC 0-2   Sq Epi: x / Non Sq Epi: Few / Bacteria: Few        MEDICATIONS  (STANDING):  insulin lispro (HumaLOG) corrective regimen sliding scale  SubCutaneous Before meals and at bedtime  dextrose 5%. 1000milliLiter(s) IV Continuous <Continuous>  dextrose 50% Injectable 12.5Gram(s) IV Push once  dextrose 50% Injectable 25Gram(s) IV Push once  dextrose 50% Injectable 25Gram(s) IV Push once  aspirin 325milliGRAM(s) Oral daily  atorvastatin 20milliGRAM(s) Oral at bedtime  donepezil 10milliGRAM(s) Oral at bedtime  amLODIPine   Tablet 10milliGRAM(s) Oral daily  heparin  Injectable 5000Unit(s) SubCutaneous every 8 hours  timolol 0.5% Solution 1Drop(s) Both EYES every 12 hours  brimonidine 0.2% Ophthalmic Solution 1Drop(s) Both EYES every 12 hours  risperiDONE   Tablet 1milliGRAM(s) Oral two times a day    MEDICATIONS  (PRN):  dextrose Gel 1Dose(s) Oral once PRN Blood Glucose LESS THAN 70 milliGRAM(s)/deciLiter  glucagon  Injectable 1milliGRAM(s) IntraMuscular once PRN Glucose <70 milliGRAM(s)/deciLiter  acetaminophen   Tablet. 650milliGRAM(s) Oral every 6 hours PRN Headache or Bodyache  haloperidol    Injectable 5milliGRAM(s) IntraMuscular every 6 hours PRN Agitation      RADIOLOGY & ADDITIONAL TESTS:  MRI Brain  IMPRESSION: Acute punctate right temporal lobe infarct.    Carotid dopplers  IMPRESSION:  Minimal bilateral soft plaque.  No elevated velocities to suggest hemodynamically significant stenosis.    TTE  Summary:   1. Technically limited study.   2. Probably normal LV systolic function on limited views.   3. Recommend repeat echo with contrast if clinically indicated.   4. The left ventricular diastolic function could not be assessed in this   study.   5. Sclerotic aortic valve   6. There is no evidence of pericardial effusion. Patient transferred to Hospitalist service.    HPI:  73 years old male with PMH of DM, CVA, HTN and Dementia sent by PMD for evaluation of AMS. As per chart, family reported that patient had been confused since Friday. Patient was talking about old  friends and having visual hallucinations. He also fell on Saturday. Fall was unwitnessed but son found him on floor around 4 am. CT negative.  MRI showing acute punctate right temporal lobe infarct.      INTERVAL HPI/OVERNIGHT EVENTS: Patient seen and examined in ER.  Patient denies any pain.  1:1 at bedside reports that patient has been sleeping.  ROS limited secondary mental status.    Vital Signs Last 24 Hrs  T(C): 37.1, Max: 37.4 (-18 @ 19:16)  T(F): 98.8, Max: 99.3 (-18 @ 19:16)  HR: 88 (88 - 124)  BP: 97/66 (97/66 - 169/93)  BP(mean): --  RR: 20 (19 - 20)  SpO2: 98% (98% - 98%)  I&O's Detail      PHYSICAL EXAM:  GENERAL: NAD  HEAD:  Atraumatic, Normocephalic  EYES:right sclera clouded. left pupil reactive to light  ENMT: No tonsillar erythema, exudates, or enlargement; Moist mucous membranes, Good dentition, No lesions  NECK: Supple, No JVD, Normal thyroid  NERVOUS SYSTEM:  Alert to tactile stimuli, Bilateral arms with contractures; Left sided weakness  CHEST/LUNG: Clear to auscultation bilaterally; No rales, rhonchi, wheezing, or rubs  HEART: Regular rate and rhythm; No murmurs, rubs, or gallops  ABDOMEN: Soft, Nontender, distended; Bowel sounds present  EXTREMITIES:  2+ Peripheral Pulses, No clubbing, cyanosis, or edema  SKIN: No rashes or lesions      CARDIAC MARKERS ( 2017 16:48 )  x     / <0.01 ng/mL / x     / x     / x                            14.9   11.8  )-----------( 317      ( 2017 16:48 )             40.8     2017 22:38    x      |  x      |  x      ----------------------------<  x      5.0     |  x      |  x        Ca    9.6        2017 16:48    TPro  8.6    /  Alb  4.1    /  TBili  1.1    /  DBili  x      /  AST  21     /  ALT  18     /  AlkPhos  101    2017 16:48    CAPILLARY BLOOD GLUCOSE  320 (2017 12:11)  174 (2017 08:48)  247 (2017 21:41)  168 (2017 16:50)    LIVER FUNCTIONS - ( 2017 16:48 )  Alb: 4.1 g/dL / Pro: 8.6 g/dL / ALK PHOS: 101 U/L / ALT: 18 U/L / AST: 21 U/L / GGT: x           Urinalysis Basic - ( 2017 21:29 )  Color: Yellow / Appearance: Clear / S.015 / pH: x  Gluc: x / Ketone: Moderate  / Bili: Negative / Urobili: Negative mg/dL   Blood: x / Protein: 15 mg/dL / Nitrite: Negative   Leuk Esterase: Negative / RBC: 6-10 /HPF / WBC 0-2   Sq Epi: x / Non Sq Epi: Few / Bacteria: Few    MEDICATIONS  (STANDING):  insulin lispro (HumaLOG) corrective regimen sliding scale  SubCutaneous Before meals and at bedtime  dextrose 5%. 1000milliLiter(s) IV Continuous <Continuous>  dextrose 50% Injectable 12.5Gram(s) IV Push once  dextrose 50% Injectable 25Gram(s) IV Push once  dextrose 50% Injectable 25Gram(s) IV Push once  aspirin 325milliGRAM(s) Oral daily  atorvastatin 20milliGRAM(s) Oral at bedtime  donepezil 10milliGRAM(s) Oral at bedtime  amLODIPine   Tablet 10milliGRAM(s) Oral daily  heparin  Injectable 5000Unit(s) SubCutaneous every 8 hours  timolol 0.5% Solution 1Drop(s) Both EYES every 12 hours  brimonidine 0.2% Ophthalmic Solution 1Drop(s) Both EYES every 12 hours  risperiDONE   Tablet 1milliGRAM(s) Oral two times a day    MEDICATIONS  (PRN):  dextrose Gel 1Dose(s) Oral once PRN Blood Glucose LESS THAN 70 milliGRAM(s)/deciLiter  glucagon  Injectable 1milliGRAM(s) IntraMuscular once PRN Glucose <70 milliGRAM(s)/deciLiter  acetaminophen   Tablet. 650milliGRAM(s) Oral every 6 hours PRN Headache or Bodyache  haloperidol    Injectable 5milliGRAM(s) IntraMuscular every 6 hours PRN Agitation    RADIOLOGY & ADDITIONAL TESTS:  MRI Brain  IMPRESSION: Acute punctate right temporal lobe infarct.    Carotid dopplers  IMPRESSION:  Minimal bilateral soft plaque.  No elevated velocities to suggest hemodynamically significant stenosis.    TTE  Summary:   1. Technically limited study.   2. Probably normal LV systolic function on limited views.   3. Recommend repeat echo with contrast if clinically indicated.   4. The left ventricular diastolic function could not be assessed in this   study.   5. Sclerotic aortic valve   6. There is no evidence of pericardial effusion.

## 2017-04-19 NOTE — DISCHARGE NOTE ADULT - HOSPITAL COURSE
CC: Confusion . Tries to climb out of bed . Disorientated in place.  Head and neck mra are normal studies.   HPI:  73 years old male with PMH of DM, CVA, HTN and Dementia sent by PMD for evaluation of AMS. As per family at bedside, patient has been confused since Friday. Has been talking about old  friends and is having visual hallucinations. He also fell on Saturday. Fall was unwitnessed but son found him on floor around 4 am. As per son, there was no focal deficit so they did not take him to any Urgent Care or ER.  His oral intake and sleep have decreased. Family denies any fever, cough, urinary symptoms or sick contacts.   Patient is alert and awake and denies any headache, speech problem, arm/leg weakness, fever, cough, chest pain, palpitations or urinary symptoms. (17 Apr 2017 21:48) CC: Confusion . Tries to climb out of bed . Disorientated in place.  Head and neck mra are normal studies.   HPI:  73 years old male with PMH of DM, CVA, HTN and Dementia sent by PMD for evaluation of AMS. As per family at bedside, patient has been confused since Friday. Has been talking about old  friends and is having visual hallucinations. He also fell on Saturday. Fall was unwitnessed but son found him on floor around 4 am. As per son, there was no focal deficit so they did not take him to any Urgent Care or ER.  His oral intake and sleep have decreased. Family denies any fever, cough, urinary symptoms or sick contacts.   Patient is alert and awake and denies any headache, speech problem, arm/leg weakness, fever, cough, chest pain, palpitations or urinary symptoms. (17 Apr 2017 21:48)    MRI brain shows acute punctate right temporal lobe infarct.  Head and neck mra showing normal study . Patient deconditioned and remain confused many time. limited mobility at pT.  Recommended for rehabs.

## 2017-04-19 NOTE — DISCHARGE NOTE ADULT - CARE PLAN
Principal Discharge DX:	Cerebrovascular accident (CVA), unspecified mechanism  Goal:	FRANCINE  Instructions for follow-up, activity and diet:	Transfer to Rehab   Dash diet   Follow up with pmd in 5-10 days  Secondary Diagnosis:	Delirium superimposed on dementia

## 2017-04-19 NOTE — ED ADULT NURSE REASSESSMENT NOTE - NS ED NURSE REASSESS COMMENT FT1
Assumed pt care @1900 from  HERB Hardwick. Pt was given ativan earlier for tests, pt is sleeping and arousable by verbal stimuli. Pt looks at you and then goes back to sleep pretty quickly.  Pt hand grasps not equal. Greater in right hand than left. Pt A&Ox1. This is pts baseline. NSR on cardiac monitor. PT remains on 1:1 for safety.  Will continue to monitor.
Pt baseline mental status, no apparent distress noted at this time. Pt handed off to RN SP in stable condition.
Pt care assumed at 1920, no apparent distress noted at this time, charting as noted. Pt is resting in bed comfortably with son at bedside. Fluids are running without difficulty, IV is without edema or redness. Plan of care explained. Will continue to monitor.
Pt is sleeping comfortably in bed at this time, no apparent distress noted. Pt denies any complaints at this time. IV fluids are running without difficulty. Will continue to monitor.
Pt is slightly more confused. Pt ripped IV out at this time. Awaiting new IV insert. Will continue to monitor.
Pt is very agitated. Pt tried climbing out the bed, stating officers are coming to take him. Pt tried taking out his IV again. MD Adames called and advised. Medicated pt as per orders. Will continue to monitor pt.
[facility]  Pt sleeping but easily arousable.  Pt denies any pain.  VSS.  Cm in place and maintained.  NSR on monitor.  1:1 at bedside for pt safety.  NIH = 18.  Pt recieved mildly sedated.  Pt alert and oriented to person only, which is patients baseline, has hx of dementia), pt is also very weak due to deconditioning, pt has good strength to right hand, right leg and left leg, weak grasp on left hand, pt also hx of old CVA. Pt has creole accent, able to understand english.   Pt waiting for bed on floor.  Will continue to monitor.

## 2017-04-20 DIAGNOSIS — Z29.9 ENCOUNTER FOR PROPHYLACTIC MEASURES, UNSPECIFIED: ICD-10-CM

## 2017-04-20 LAB
ANION GAP SERPL CALC-SCNC: 15 MMOL/L — SIGNIFICANT CHANGE UP (ref 5–17)
BUN SERPL-MCNC: 25 MG/DL — HIGH (ref 8–20)
CALCIUM SERPL-MCNC: 9.4 MG/DL — SIGNIFICANT CHANGE UP (ref 8.6–10.2)
CHLORIDE SERPL-SCNC: 100 MMOL/L — SIGNIFICANT CHANGE UP (ref 98–107)
CO2 SERPL-SCNC: 22 MMOL/L — SIGNIFICANT CHANGE UP (ref 22–29)
CREAT SERPL-MCNC: 0.66 MG/DL — SIGNIFICANT CHANGE UP (ref 0.5–1.3)
GLUCOSE SERPL-MCNC: 172 MG/DL — HIGH (ref 70–115)
HCT VFR BLD CALC: 37.9 % — LOW (ref 42–52)
HGB BLD-MCNC: 13.6 G/DL — LOW (ref 14–18)
MCHC RBC-ENTMCNC: 30.1 PG — SIGNIFICANT CHANGE UP (ref 27–31)
MCHC RBC-ENTMCNC: 35.9 G/DL — SIGNIFICANT CHANGE UP (ref 32–36)
MCV RBC AUTO: 83.8 FL — SIGNIFICANT CHANGE UP (ref 80–94)
PLATELET # BLD AUTO: 282 K/UL — SIGNIFICANT CHANGE UP (ref 150–400)
POTASSIUM SERPL-MCNC: 4.4 MMOL/L — SIGNIFICANT CHANGE UP (ref 3.5–5.3)
POTASSIUM SERPL-SCNC: 4.4 MMOL/L — SIGNIFICANT CHANGE UP (ref 3.5–5.3)
RBC # BLD: 4.52 M/UL — LOW (ref 4.6–6.2)
RBC # FLD: 13.1 % — SIGNIFICANT CHANGE UP (ref 11–15.6)
SODIUM SERPL-SCNC: 137 MMOL/L — SIGNIFICANT CHANGE UP (ref 135–145)
VIT B12 SERPL-MCNC: > 1500 PG/ML — SIGNIFICANT CHANGE UP (ref 180–914)
WBC # BLD: 12.6 K/UL — HIGH (ref 4.8–10.8)
WBC # FLD AUTO: 12.6 K/UL — HIGH (ref 4.8–10.8)

## 2017-04-20 PROCEDURE — 99233 SBSQ HOSP IP/OBS HIGH 50: CPT

## 2017-04-20 PROCEDURE — 73502 X-RAY EXAM HIP UNI 2-3 VIEWS: CPT | Mod: 26,LT

## 2017-04-20 PROCEDURE — 73552 X-RAY EXAM OF FEMUR 2/>: CPT | Mod: 26,LT

## 2017-04-20 RX ADMIN — Medication 1: at 08:08

## 2017-04-20 RX ADMIN — RISPERIDONE 1 MILLIGRAM(S): 4 TABLET ORAL at 20:28

## 2017-04-20 RX ADMIN — RISPERIDONE 1 MILLIGRAM(S): 4 TABLET ORAL at 05:41

## 2017-04-20 RX ADMIN — HEPARIN SODIUM 5000 UNIT(S): 5000 INJECTION INTRAVENOUS; SUBCUTANEOUS at 15:07

## 2017-04-20 RX ADMIN — Medication 2: at 11:25

## 2017-04-20 RX ADMIN — Medication 1 DROP(S): at 05:39

## 2017-04-20 RX ADMIN — Medication 1: at 16:29

## 2017-04-20 RX ADMIN — DONEPEZIL HYDROCHLORIDE 10 MILLIGRAM(S): 10 TABLET, FILM COATED ORAL at 21:19

## 2017-04-20 RX ADMIN — ATORVASTATIN CALCIUM 20 MILLIGRAM(S): 80 TABLET, FILM COATED ORAL at 21:19

## 2017-04-20 RX ADMIN — HEPARIN SODIUM 5000 UNIT(S): 5000 INJECTION INTRAVENOUS; SUBCUTANEOUS at 05:39

## 2017-04-20 RX ADMIN — Medication 325 MILLIGRAM(S): at 11:26

## 2017-04-20 RX ADMIN — BRIMONIDINE TARTRATE 1 DROP(S): 2 SOLUTION/ DROPS OPHTHALMIC at 17:51

## 2017-04-20 RX ADMIN — BRIMONIDINE TARTRATE 1 DROP(S): 2 SOLUTION/ DROPS OPHTHALMIC at 05:39

## 2017-04-20 RX ADMIN — Medication 1: at 21:20

## 2017-04-20 RX ADMIN — Medication 1 DROP(S): at 17:50

## 2017-04-20 RX ADMIN — AMLODIPINE BESYLATE 10 MILLIGRAM(S): 2.5 TABLET ORAL at 06:00

## 2017-04-20 RX ADMIN — HEPARIN SODIUM 5000 UNIT(S): 5000 INJECTION INTRAVENOUS; SUBCUTANEOUS at 21:19

## 2017-04-20 NOTE — SWALLOW BEDSIDE ASSESSMENT ADULT - ORAL PHASE
Delayed oral transit time/>2 minutes to masticate piece of chicken , due to dentition/Decreased anterior-posterior movement of the bolus Decreased anterior-posterior movement of the bolus/Delayed oral transit time/>2 minutes, impacted by dentition however functional given dentition/Delayed oral transit time Within functional limits

## 2017-04-20 NOTE — SWALLOW BEDSIDE ASSESSMENT ADULT - ASR SWALLOW ASPIRATION MONITOR
cough/throat clearing/gurgly voice/oral hygiene/change of breathing pattern/fever/pneumonia/upper respiratory infection/position upright (89Y)

## 2017-04-20 NOTE — SWALLOW BEDSIDE ASSESSMENT ADULT - SWALLOW EVAL: DIAGNOSIS
Oral dysphagia for soft & regular solids, impacted by dentition. Pharyngeal stage clinically unremarkable with no overt s/s of aspiration

## 2017-04-20 NOTE — PROGRESS NOTE ADULT - SUBJECTIVE AND OBJECTIVE BOX
chief complaint of AMS (19 Apr 2017 23:06)    HPI:  73 years old male with PMH of DM, CVA, HTN and Dementia sent by PMD for evaluation of AMS. As per family at bedside, patient has been confused since Friday. Has been talking about old  friends and is having visual hallucinations. He also fell on Saturday. Fall was unwitnessed but son found him on floor around 4 am. As per son, there was no focal deficit so they did not take him to any Urgent Care or ER.  His oral intake and sleep have decreased.     Today:   noted to be more awake. Pt has been lethargic since receiving haldol and ativan for mri imaging yesterday. Today pt is more awake.    Pt is conversing with daughter in creole.    ROS:   +left hip pain   no sob  no chest pain   no cough    PAST MEDICAL & SURGICAL HISTORY:  CVA (cerebral vascular accident)  Hyperlipemia  Diabetes  Hypertension  Dementia    MEDICATIONS  (STANDING):  insulin lispro (HumaLOG) corrective regimen sliding scale  SubCutaneous Before meals and at bedtime  dextrose 5%. 1000milliLiter(s) IV Continuous <Continuous>  dextrose 50% Injectable 12.5Gram(s) IV Push once  dextrose 50% Injectable 25Gram(s) IV Push once  dextrose 50% Injectable 25Gram(s) IV Push once  aspirin 325milliGRAM(s) Oral daily  atorvastatin 20milliGRAM(s) Oral at bedtime  donepezil 10milliGRAM(s) Oral at bedtime  amLODIPine   Tablet 10milliGRAM(s) Oral daily  heparin  Injectable 5000Unit(s) SubCutaneous every 8 hours  timolol 0.5% Solution 1Drop(s) Both EYES every 12 hours  brimonidine 0.2% Ophthalmic Solution 1Drop(s) Both EYES every 12 hours  risperiDONE   Tablet 1milliGRAM(s) Oral two times a day  LORazepam   Injectable 1milliGRAM(s) IV Push once    MEDICATIONS  (PRN):  dextrose Gel 1Dose(s) Oral once PRN Blood Glucose LESS THAN 70 milliGRAM(s)/deciLiter  glucagon  Injectable 1milliGRAM(s) IntraMuscular once PRN Glucose <70 milliGRAM(s)/deciLiter  acetaminophen   Tablet. 650milliGRAM(s) Oral every 6 hours PRN Headache or Bodyache  haloperidol    Injectable 5milliGRAM(s) IntraMuscular every 6 hours PRN Agitation    LABS:                      13.6   12.6  )-----------( 282      ( 20 Apr 2017 08:12 )             37.9   04-20  137  |  100  |  25.0<H>  ----------------------------<  172<H>  4.4   |  22.0  |  0.66  Ca    9.4      20 Apr 2017 08:10  Phos  3.3     04-19  Mg     2.0     04-19    PHYSICAL EXAM:  Vital Signs Last 24 Hrs  T(C): 37, Max: 37 (04-20 @ 12:14)  T(F): 98.6, Max: 98.6 (04-20 @ 12:14)  HR: 87 (72 - 87)  BP: 110/72 (92/78 - 120/69)  BP(mean): --  RR: 19 (18 - 20)  SpO2: 99% (97% - 100%)    GENERAL LETHARGIC ELDERLY MALE LAYING IN BED. AWAKE. CONVERSING WITH DAUGHTER.  HEENT NORMAL CEPHALIC ATRAUMATIC, BLIND IN RIGHT EYE   CVS: S1,S2,RRR, NO MURMUR, NO RUBS, NO GALLOPS  RESP: BL CTA, NO WHEEZING, NO RHONCHI, NO CRACKLES, NO LABORED BREATHING  ABD: SOFT, NON TENDER, NON DISTENDED, +BOWEL SOUNDS IN ALL 4 QUADRANTS  EXT: NO EDEMA. NO LEFT HIP TENDERNESS ON PALPATION  SKIN: WARM, DRY, INTACT  NEURO LEFT HEMIPARESIS.  MUSCULOSKELETAL: ROM INTACT IN ALL 4 EXTREMITIES

## 2017-04-20 NOTE — PROGRESS NOTE ADULT - SUBJECTIVE AND OBJECTIVE BOX
INTERVAL HISTORY:  No events per nursing  	  MEDICATIONS:  amLODIPine   Tablet 10milliGRAM(s) Oral daily  aspirin 325milliGRAM(s) Oral daily  donepezil 10milliGRAM(s) Oral at bedtime  acetaminophen   Tablet. 650milliGRAM(s) Oral every 6 hours PRN  risperiDONE   Tablet 1milliGRAM(s) Oral two times a day  haloperidol    Injectable 5milliGRAM(s) IntraMuscular every 6 hours PRN  insulin lispro (HumaLOG) corrective regimen sliding scale  SubCutaneous Before meals and at bedtime  dextrose Gel 1Dose(s) Oral once PRN  dextrose 50% Injectable 12.5Gram(s) IV Push once  dextrose 50% Injectable 25Gram(s) IV Push once  dextrose 50% Injectable 25Gram(s) IV Push once  glucagon  Injectable 1milliGRAM(s) IntraMuscular once PRN  atorvastatin 20milliGRAM(s) Oral at bedtime  dextrose 5%. 1000milliLiter(s) IV Continuous <Continuous>  heparin  Injectable 5000Unit(s) SubCutaneous every 8 hours  timolol 0.5% Solution 1Drop(s) Both EYES every 12 hours  brimonidine 0.2% Ophthalmic Solution 1Drop(s) Both EYES every 12 hours        PHYSICAL EXAM:  T(C): 36.4, Max: 37.1 (04-19 @ 12:17)  HR: 80 (72 - 88)  BP: 110/66 (92/78 - 120/69)  RR: 18 (18 - 20)  SpO2: 99% (97% - 100%)  Wt(kg): --  I&O's Summary    I & Os for current day (as of 20 Apr 2017 08:37)  =============================================  IN: 240 ml / OUT: 0 ml / NET: 240 ml        Appearance: Normal	  HEENT:   Normal oral mucosa, PERRL, EOMI	  Lymphatic: No lymphadenopathy  Cardiovascular: Normal S1 S2, No JVD, No murmurs, No edema  Respiratory: Lungs clear to auscultation	  Psychiatry: A & O x 3, Mood & affect appropriate  Gastrointestinal:  Soft, Non-tender, + BS	  Skin: No rashes, No ecchymoses, No cyanosis  Extremities: Normal range of motion, No clubbing, cyanosis or edema  Vascular: Peripheral pulses palpable 2+ bilaterally    TELEMETRY: 	    ECG:  	  RADIOLOGY:   DIAGNOSTIC TESTING:  [ ] Echocardiogram:  [ ]  Catheterization:  [ ] Stress Test:    OTHER: 	    LABS:	                         13.6   12.6  )-----------( 282      ( 20 Apr 2017 08:12 )             37.9     04-19    132<L>  |  96<L>  |  30.0<H>  ----------------------------<  193<H>  4.7   |  23.0  |  0.80    Ca    9.5      19 Apr 2017 21:04  Phos  3.3     04-19  Mg     2.0     04-19      proBNP:   Lipid Profile:   HgA1c: Hemoglobin A1C, Whole Blood: 10.2 % (04-19 @ 21:04)        ASSESSMENT/PLAN: Acute CVA, right temporal. Hx of CVA, possible LOC.  W/U is negative for etiology thus far.  Echocardiogram is TDS but likely WNL, no significant disease in the Carotid.  Further w/u as per Neuro.  Telemetry.

## 2017-04-20 NOTE — PROGRESS NOTE ADULT - ASSESSMENT
73 years old male with PMH of DM, CVA, HTN and Dementia sent by PMD for evaluation of AMS. As per chart, family reported that patient had been confused since Friday. Patient was talking about old  friends and having visual hallucinations. He also fell on Saturday. Fall was unwitnessed but son found him on floor around 4 am. CT negative.  MRI showing acute punctate right temporal lobe infarct.

## 2017-04-20 NOTE — SWALLOW BEDSIDE ASSESSMENT ADULT - SWALLOW EVAL: RECOMMENDED FEEDING/EATING TECHNIQUES
maintain upright posture during/after eating for 30 mins/position upright (90 degrees)/allow for swallow between intakes

## 2017-04-20 NOTE — PROGRESS NOTE ADULT - SUBJECTIVE AND OBJECTIVE BOX
INTERVAL HISTORY:  stable today, sleeping mostly      VITAL SIGNS:  Vital Signs Last 24 Hrs  T(C): 36.7, Max: 37.1 (04-19 @ 12:17)  T(F): 98.1, Max: 98.8 (04-19 @ 12:17)  HR: 80 (72 - 88)  BP: 118/67 (92/78 - 120/69)  BP(mean): --  RR: 18 (18 - 20)  SpO2: 100% (97% - 100%)    PHYSICAL EXAMINATION:    Mentation:  awakens easlily and follows commands, anwers a few questions  Language/Speech:nl  CN: nl  Visual Fields: full to threat  Motor: moderate left hemiparesis  Sensory:  DTR:  Babinski:      MEDS:  MEDICATIONS  (STANDING):  insulin lispro (HumaLOG) corrective regimen sliding scale  SubCutaneous Before meals and at bedtime  dextrose 5%. 1000milliLiter(s) IV Continuous <Continuous>  dextrose 50% Injectable 12.5Gram(s) IV Push once  dextrose 50% Injectable 25Gram(s) IV Push once  dextrose 50% Injectable 25Gram(s) IV Push once  aspirin 325milliGRAM(s) Oral daily  atorvastatin 20milliGRAM(s) Oral at bedtime  donepezil 10milliGRAM(s) Oral at bedtime  amLODIPine   Tablet 10milliGRAM(s) Oral daily  heparin  Injectable 5000Unit(s) SubCutaneous every 8 hours  timolol 0.5% Solution 1Drop(s) Both EYES every 12 hours  brimonidine 0.2% Ophthalmic Solution 1Drop(s) Both EYES every 12 hours  risperiDONE   Tablet 1milliGRAM(s) Oral two times a day  LORazepam   Injectable 1milliGRAM(s) IV Push once    MEDICATIONS  (PRN):  dextrose Gel 1Dose(s) Oral once PRN Blood Glucose LESS THAN 70 milliGRAM(s)/deciLiter  glucagon  Injectable 1milliGRAM(s) IntraMuscular once PRN Glucose <70 milliGRAM(s)/deciLiter  acetaminophen   Tablet. 650milliGRAM(s) Oral every 6 hours PRN Headache or Bodyache  haloperidol    Injectable 5milliGRAM(s) IntraMuscular every 6 hours PRN Agitation      LABS:                          13.6   12.6  )-----------( 282      ( 20 Apr 2017 08:12 )             37.9     04-20    137  |  100  |  25.0<H>  ----------------------------<  172<H>  4.4   |  22.0  |  0.66    Ca    9.4      20 Apr 2017 08:10  Phos  3.3     04-19  Mg     2.0     04-19            RADIOLOGY & ADDITIONAL STUDIES:    MRI- small acutr right temporal lobe infarct  duplex - mild atherosclerosis without stenosis    IMPRESSION & PLAN:    Cerebral infarct - R MCA territory  Dementia    Rec;  Stroke protocols  Await MRA studies  Cerebrovascular risk factor assessment and management  Antiplatelet therapy as prescribed.  Dispo planning

## 2017-04-21 DIAGNOSIS — M79.652 PAIN IN LEFT THIGH: ICD-10-CM

## 2017-04-21 LAB — VIT B1 SERPL-MCNC: 112.8 NMOL/L — SIGNIFICANT CHANGE UP (ref 66.5–200)

## 2017-04-21 PROCEDURE — 99233 SBSQ HOSP IP/OBS HIGH 50: CPT

## 2017-04-21 RX ADMIN — DONEPEZIL HYDROCHLORIDE 10 MILLIGRAM(S): 10 TABLET, FILM COATED ORAL at 21:42

## 2017-04-21 RX ADMIN — HEPARIN SODIUM 5000 UNIT(S): 5000 INJECTION INTRAVENOUS; SUBCUTANEOUS at 05:49

## 2017-04-21 RX ADMIN — RISPERIDONE 1 MILLIGRAM(S): 4 TABLET ORAL at 05:49

## 2017-04-21 RX ADMIN — RISPERIDONE 1 MILLIGRAM(S): 4 TABLET ORAL at 17:36

## 2017-04-21 RX ADMIN — Medication 1 DROP(S): at 17:35

## 2017-04-21 RX ADMIN — Medication 325 MILLIGRAM(S): at 12:18

## 2017-04-21 RX ADMIN — BRIMONIDINE TARTRATE 1 DROP(S): 2 SOLUTION/ DROPS OPHTHALMIC at 17:35

## 2017-04-21 RX ADMIN — Medication 1 DROP(S): at 05:48

## 2017-04-21 RX ADMIN — Medication 1: at 13:54

## 2017-04-21 RX ADMIN — Medication 650 MILLIGRAM(S): at 05:50

## 2017-04-21 RX ADMIN — Medication 650 MILLIGRAM(S): at 06:43

## 2017-04-21 RX ADMIN — ATORVASTATIN CALCIUM 20 MILLIGRAM(S): 80 TABLET, FILM COATED ORAL at 21:42

## 2017-04-21 RX ADMIN — BRIMONIDINE TARTRATE 1 DROP(S): 2 SOLUTION/ DROPS OPHTHALMIC at 05:48

## 2017-04-21 RX ADMIN — HEPARIN SODIUM 5000 UNIT(S): 5000 INJECTION INTRAVENOUS; SUBCUTANEOUS at 21:42

## 2017-04-21 RX ADMIN — HEPARIN SODIUM 5000 UNIT(S): 5000 INJECTION INTRAVENOUS; SUBCUTANEOUS at 13:55

## 2017-04-21 RX ADMIN — AMLODIPINE BESYLATE 10 MILLIGRAM(S): 2.5 TABLET ORAL at 05:49

## 2017-04-21 NOTE — PROGRESS NOTE ADULT - SUBJECTIVE AND OBJECTIVE BOX
[facility], [facility]                                                    [address], [address], [place] [address]                                                         PHONE: [phone]    FAX: [phone] [address], [address], [place] [address]                                                 PHONE: [phone]    FAX: [phone]  *******************************************************************************    Overnight events/Subjective Assessment:  No new cardiovascular issues.  Patient appears confused and is a poor historian.  No active chest pain or SOB.  ? foot pain.    No Known Allergies    MEDICATIONS  (STANDING):  insulin lispro (HumaLOG) corrective regimen sliding scale  SubCutaneous Before meals and at bedtime  dextrose 5%. 1000milliLiter(s) IV Continuous <Continuous>  dextrose 50% Injectable 12.5Gram(s) IV Push once  dextrose 50% Injectable 25Gram(s) IV Push once  dextrose 50% Injectable 25Gram(s) IV Push once  aspirin 325milliGRAM(s) Oral daily  atorvastatin 20milliGRAM(s) Oral at bedtime  donepezil 10milliGRAM(s) Oral at bedtime  amLODIPine   Tablet 10milliGRAM(s) Oral daily  heparin  Injectable 5000Unit(s) SubCutaneous every 8 hours  timolol 0.5% Solution 1Drop(s) Both EYES every 12 hours  brimonidine 0.2% Ophthalmic Solution 1Drop(s) Both EYES every 12 hours  risperiDONE   Tablet 1milliGRAM(s) Oral two times a day  LORazepam   Injectable 1milliGRAM(s) IV Push once    MEDICATIONS  (PRN):  dextrose Gel 1Dose(s) Oral once PRN Blood Glucose LESS THAN 70 milliGRAM(s)/deciLiter  glucagon  Injectable 1milliGRAM(s) IntraMuscular once PRN Glucose <70 milliGRAM(s)/deciLiter  acetaminophen   Tablet. 650milliGRAM(s) Oral every 6 hours PRN Headache or Bodyache  haloperidol    Injectable 5milliGRAM(s) IntraMuscular every 6 hours PRN Agitation      Vital Signs Last 24 Hrs  T(C): 36.6, Max: 37 (04-20 @ 12:14)  T(F): 97.9, Max: 98.6 (04-20 @ 12:14)  HR: 88 (82 - 88)  BP: 135/61 (103/62 - 135/61)  BP(mean): 80 (76 - 86)  RR: 15 (15 - 19)  SpO2: 100% (99% - 100%)    I&O's Detail    I & Os for current day (as of 21 Apr 2017 07:32)  =============================================  IN:    Oral Fluid: 50 ml    Total IN: 50 ml  ---------------------------------------------  OUT:    Voided: 400 ml    Total OUT: 400 ml  ---------------------------------------------  Total NET: -350 ml    I&O's Summary    I & Os for current day (as of 21 Apr 2017 07:32)  =============================================  IN: 50 ml / OUT: 400 ml / NET: -350 ml          PHYSICAL EXAM:  General: Appears well developed, well nourished, no acute distress  HEENT: Head: normocephalic, atraumatic  Eyes: Pupils equal and reactive  Neck: Supple, no carotid bruit, no JVD, no HJR  CARDIOVASCULAR: Normal S1 and S2, no murmur, rub, or gallop  LUNGS: Clear to auscultation bilaterally, no rales, rhonchi or wheeze  ABDOMEN: Soft, nontender, non-distended, positive bowel sounds, no mass or bruit  EXTREMITIES: No edema, distal pulses WNL  SKIN: Warm and dry with normal turgor  NEURO: Alert, confused  PSYCH: normal mood and affect        LABS:                        13.6   12.6  )-----------( 282      ( 20 Apr 2017 08:12 )             37.9     04-20    137  |  100  |  25.0<H>  ----------------------------<  172<H>  4.4   |  22.0  |  0.66    Ca    9.4      20 Apr 2017 08:10  Phos  3.3     04-19  Mg     2.0     04-19            Serum Pro-Brain Natriuretic Peptide: 21 pg/mL (04-17 @ 16:48)  serum  Lipids:   Hemoglobin A1C, Whole Blood: 10.2 % (04-19 @ 21:04)    Thyroid Stimulating Hormone, Serum: 0.79 uIU/mL (04-17 @ 22:38)      RADIOLOGY & ADDITIONAL STUDIES:    TELEMETRY: NSR, no events    ECHO (4/18/17):  PHYSICIAN INTERPRETATION:  Left Ventricle: The left ventricular internal cavity size is normal.  The left ventricular diastolic function could not be assessed in this   study.  Right Ventricle: The right ventricle was not well visualized.  Left Atrium: The left atrium was not well visualized.  Right Atrium: The right atrium was not well visualized.  Pericardium: There is no evidence of pericardial effusion.  Mitral Valve: The mitral valve is not well seen.  Tricuspid Valve: The tricuspid valve is not well seen. No tricuspid   regurgitation is visualized.  Aortic Valve: The aortic valve was not well visualized. Sclerotic aortic   valve with decreased opening.  Pulmonic Valve: The pulmonic valve was not well visualized.  Aorta: The aortic root is normal in size and structure. The ascending   aorta was not well visualized.  Pulmonary Artery: The pulmonary artery is not well seen.  Venous: The pulmonary veins were not well visualized. Not seen.  In comparison to the previous echocardiogram(s): There are no prior   studies on this patient for comparison purposes.   Summary:   1. Technically limited study.   2. Probably normal LV systolic function on limited views.   3. Recommend repeat echo with contrast if clinically indicated.   4. The left ventricular diastolic function could not be assessed in this study.   5. Sclerotic aortic valve   6. There is no evidence of pericardial effusion.      CAROTID DUPLEX SCAN (4/18/17):  IMPRESSION:  Minimal bilateral soft plaque.  No elevated velocities to suggest hemodynamically significant stenosis.      MRI BRAIN (4/19/17):  IMPRESSION: Acute punctate right temporal lobe infarct.      ASSESSMENT AND PLAN:  In summary, TREY MITCHELL is a 81M a/w acute CVA, h/o HTN, HL, dementia  - Stable cardiovascular status, no evidence of ischemia or CHF clinically = continue medical management  - Echocardiogram 4/18/17 was a technically difficult study but likely demonstrated normal LV fxn, sclerotic aortic valve with decreased opening, valves not clearly seen, no pericardial effusion  - Carotid duplex scan 4/18/17 demonstrated minimal plaque with no hemodynamically significant stenosis bilaterally  - Rhythm/hemodynamics stable = continue current dose of Amlodipine 10 daily for now and titrate PRN  -  & Lipitor 20 daily in place  - Further work-up & evaluation per neurology.  No further inpatient cardiac testing to be performed at this time.    - Will sign off for now and follow PRN.  Please call with any cardiovascular questions/issues.    Elias Cevallos MD

## 2017-04-21 NOTE — PHYSICAL THERAPY INITIAL EVALUATION ADULT - PERTINENT HX OF CURRENT PROBLEM, REHAB EVAL
Pt from home with son, presented to [facility] secondary s/p fall and change in mental status,  pt with + right temporal lobe infarct as per MRI.

## 2017-04-21 NOTE — PHYSICAL THERAPY INITIAL EVALUATION ADULT - GAIT DEVIATIONS NOTED, PT EVAL
cues to advance left lower and min assist to help steer Rw/decreased weight-shifting ability/decreased stride length/decreased step length/decreased swing-to-stance ratio

## 2017-04-21 NOTE — PROGRESS NOTE ADULT - ATTENDING COMMENTS
DC planning to [facility] if MRA head/neck with no acute findings.
I saw and examined the patient with the NP in the ED. Agree with plan.
discussed with son,   patient safety and family  safety, and quality of living.  discussed possibility of long term placement, son agrees with this disposition idea.

## 2017-04-21 NOTE — PROGRESS NOTE ADULT - SUBJECTIVE AND OBJECTIVE BOX
chief complaint of AMS (19 Apr 2017 23:06)    HPI:  73 years old male with PMH of DM, CVA, HTN and Dementia sent by PMD for evaluation of AMS. As per family at bedside, patient has been confused since Friday. Has been talking about old  friends and is having visual hallucinations. He also fell on Saturday. Fall was unwitnessed but son found him on floor around 4 am. As per son, there was no focal deficit so they did not take him to any Urgent Care or ER. His oral intake has decreased. Family denies any fever, cough, urinary symptoms or sick contacts.     Today:  pt is more awake, following commands. Pt notes pain in left thigh today. pain is worse on movement.     ROS:   no headaches  no chest pain   no fevers    HEALTH ISSUES - PROBLEM Dx:  Prophylactic measure: Prophylactic measure  Acute renal failure, unspecified acute renal failure type: Acute renal failure, unspecified acute renal failure type  Essential hypertension: Essential hypertension  Diabetes: Diabetes  Hypertension: Hypertension  Altered mental status, unspecified: Altered mental status, unspecified  Delirium superimposed on dementia  Dementia with behavioral disturbance, unspecified dementia type: Dementia with behavioral disturbance, unspecified dementia type  Cerebrovascular accident (CVA), unspecified mechanism: Cerebrovascular accident (CVA), unspecified mechanism  Delirium: Delirium    MEDICATIONS  (STANDING):  insulin lispro (HumaLOG) corrective regimen sliding scale  SubCutaneous Before meals and at bedtime  dextrose 5%. 1000milliLiter(s) IV Continuous <Continuous>  dextrose 50% Injectable 12.5Gram(s) IV Push once  dextrose 50% Injectable 25Gram(s) IV Push once  dextrose 50% Injectable 25Gram(s) IV Push once  aspirin 325milliGRAM(s) Oral daily  atorvastatin 20milliGRAM(s) Oral at bedtime  donepezil 10milliGRAM(s) Oral at bedtime  amLODIPine   Tablet 10milliGRAM(s) Oral daily  heparin  Injectable 5000Unit(s) SubCutaneous every 8 hours  timolol 0.5% Solution 1Drop(s) Both EYES every 12 hours  brimonidine 0.2% Ophthalmic Solution 1Drop(s) Both EYES every 12 hours  risperiDONE   Tablet 1milliGRAM(s) Oral two times a day  LORazepam   Injectable 1milliGRAM(s) IV Push once    MEDICATIONS  (PRN):  dextrose Gel 1Dose(s) Oral once PRN Blood Glucose LESS THAN 70 milliGRAM(s)/deciLiter  glucagon  Injectable 1milliGRAM(s) IntraMuscular once PRN Glucose <70 milliGRAM(s)/deciLiter  acetaminophen   Tablet. 650milliGRAM(s) Oral every 6 hours PRN Headache or Bodyache  haloperidol    Injectable 5milliGRAM(s) IntraMuscular every 6 hours PRN Agitation    LABS:                  13.6   12.6  )-----------( 282      ( 20 Apr 2017 08:12 )             37.9   04-20  137  |  100  |  25.0<H>  ----------------------------<  172<H>  4.4   |  22.0  |  0.66  Ca    9.4      20 Apr 2017 08:10  Phos  3.3     04-19  Mg     2.0     04-19    PHYSICAL EXAM:  Vital Signs Last 24 Hrs  T(C): 36.9, Max: 36.9 (04-21 @ 01:00)  T(F): 98.4, Max: 98.4 (04-21 @ 01:00)  HR: 76 (76 - 88)  BP: 102/71 (102/59 - 135/61)  BP(mean): 81 (76 - 87)  RR: 16 (14 - 18)  SpO2: 100% (100% - 100%)    GENERAL NAD, ALERT LAYING IN BED  HEENT NORMAL CEPHALIC ATRAUMATIC, BLIND IN RIGHT EYE  CVS: S1,S2,RRR, NO MURMUR, NO RUBS, NO GALLOPS  RESP: BL CTA, NO WHEEZING, NO RHONCHI, NO CRACKLES, NO LABORED BREATHING  ABD: SOFT, NON TENDER, NON DISTENDED, +BOWEL SOUNDS IN ALL 4 QUADRANTS  EXT: NO EDEMA. TENDER TO RIGHT HIP ON ROM.   SKIN: WARM, DRY, INTACT  RECTAL DEFFERED  PSYCH APPROPRIATE

## 2017-04-21 NOTE — PHYSICAL THERAPY INITIAL EVALUATION ADULT - TRANSFER SAFETY CONCERNS NOTED: BED/CHAIR, REHAB EVAL
decreased weight-shifting ability/stepping too close to front of assistive device/decreased step length/decreased safety awareness

## 2017-04-21 NOTE — PHYSICAL THERAPY INITIAL EVALUATION ADULT - ADDITIONAL COMMENTS
pt resides with son, 4 steps to enter into house with no hand rails. Pt uses SAC at home at times, assist required  at times for ADL as per son,

## 2017-04-21 NOTE — PHYSICAL THERAPY INITIAL EVALUATION ADULT - IMPAIRED TRANSFERS: BED/CHAIR, REHAB EVAL
impaired balance/cognition/decreased ROM/decreased flexibility/decreased strength/impaired coordination

## 2017-04-21 NOTE — PHYSICAL THERAPY INITIAL EVALUATION ADULT - IMPAIRMENTS FOUND, PT EVAL
cognitive impairment/posture/muscle strength/aerobic capacity/endurance/ROM/poor safety awareness/gait, locomotion, and balance

## 2017-04-21 NOTE — PHYSICAL THERAPY INITIAL EVALUATION ADULT - PLANNED THERAPY INTERVENTIONS, PT EVAL
motor coordination training/balance training/neuromuscular re-education/bed mobility training/transfer training/ROM/strengthening

## 2017-04-21 NOTE — PHYSICAL THERAPY INITIAL EVALUATION ADULT - GENERAL OBSERVATIONS, REHAB EVAL
pt received in bed in no distress, son and daughter in law present , + tele and pulse ox in place, Pt is alert and oriented x3, creole speaking

## 2017-04-21 NOTE — PROGRESS NOTE ADULT - SUBJECTIVE AND OBJECTIVE BOX
INTERVAL HISTORY:  awake sitting in charir      VITAL SIGNS:  Vital Signs Last 24 Hrs  T(C): 36.9, Max: 37 (04-20 @ 12:14)  T(F): 98.4, Max: 98.6 (04-20 @ 12:14)  HR: 85 (82 - 88)  BP: 102/59 (102/59 - 135/61)  BP(mean): 87 (76 - 87)  RR: 14 (14 - 19)  SpO2: 100% (99% - 100%)    PHYSICAL EXAMINATION:    Mentation:  awake and cooperative. follow some verbal english commands. Intact Creole- per the PT therapist  Language/Speech:above  CN: nl  Visual Fields:full  Motor: no groos weakenss, LEONARD intact, no pronator  Sensory:  DTR:  Babinski:      MEDS:  MEDICATIONS  (STANDING):  insulin lispro (HumaLOG) corrective regimen sliding scale  SubCutaneous Before meals and at bedtime  dextrose 5%. 1000milliLiter(s) IV Continuous <Continuous>  dextrose 50% Injectable 12.5Gram(s) IV Push once  dextrose 50% Injectable 25Gram(s) IV Push once  dextrose 50% Injectable 25Gram(s) IV Push once  aspirin 325milliGRAM(s) Oral daily  atorvastatin 20milliGRAM(s) Oral at bedtime  donepezil 10milliGRAM(s) Oral at bedtime  amLODIPine   Tablet 10milliGRAM(s) Oral daily  heparin  Injectable 5000Unit(s) SubCutaneous every 8 hours  timolol 0.5% Solution 1Drop(s) Both EYES every 12 hours  brimonidine 0.2% Ophthalmic Solution 1Drop(s) Both EYES every 12 hours  risperiDONE   Tablet 1milliGRAM(s) Oral two times a day  LORazepam   Injectable 1milliGRAM(s) IV Push once    MEDICATIONS  (PRN):  dextrose Gel 1Dose(s) Oral once PRN Blood Glucose LESS THAN 70 milliGRAM(s)/deciLiter  glucagon  Injectable 1milliGRAM(s) IntraMuscular once PRN Glucose <70 milliGRAM(s)/deciLiter  acetaminophen   Tablet. 650milliGRAM(s) Oral every 6 hours PRN Headache or Bodyache  haloperidol    Injectable 5milliGRAM(s) IntraMuscular every 6 hours PRN Agitation      LABS:                          13.6   12.6  )-----------( 282      ( 20 Apr 2017 08:12 )             37.9     04-20    137  |  100  |  25.0<H>  ----------------------------<  172<H>  4.4   |  22.0  |  0.66    Ca    9.4      20 Apr 2017 08:10  Phos  3.3     04-19  Mg     2.0     04-19            RADIOLOGY & ADDITIONAL STUDIES:      IMPRESSION & PLAN:    right temporal lobe cerebral infarct (MCA)  doing well     Plan:  Await MRAs  Cerebrovascular risk factor assessment and management  Antiplatelet therapy as prescribed.  dispo planning  OK for D/C if MRA studies show no actionable findings

## 2017-04-21 NOTE — PHYSICAL THERAPY INITIAL EVALUATION ADULT - MANUAL MUSCLE TESTING RESULTS, REHAB EVAL
right upper and right lower grossly 3/5 , left upper 2+/5 and left hip an d left knee 2+/5 and left ankle 3-/5

## 2017-04-22 DIAGNOSIS — E11.59 TYPE 2 DIABETES MELLITUS WITH OTHER CIRCULATORY COMPLICATIONS: ICD-10-CM

## 2017-04-22 DIAGNOSIS — E78.2 MIXED HYPERLIPIDEMIA: ICD-10-CM

## 2017-04-22 LAB
ANION GAP SERPL CALC-SCNC: 10 MMOL/L — SIGNIFICANT CHANGE UP (ref 5–17)
BUN SERPL-MCNC: 13 MG/DL — SIGNIFICANT CHANGE UP (ref 8–20)
CALCIUM SERPL-MCNC: 9.8 MG/DL — SIGNIFICANT CHANGE UP (ref 8.6–10.2)
CHLORIDE SERPL-SCNC: 96 MMOL/L — LOW (ref 98–107)
CO2 SERPL-SCNC: 28 MMOL/L — SIGNIFICANT CHANGE UP (ref 22–29)
CREAT SERPL-MCNC: 0.79 MG/DL — SIGNIFICANT CHANGE UP (ref 0.5–1.3)
CULTURE RESULTS: SIGNIFICANT CHANGE UP
CULTURE RESULTS: SIGNIFICANT CHANGE UP
GLUCOSE SERPL-MCNC: 252 MG/DL — HIGH (ref 70–115)
HCT VFR BLD CALC: 36.7 % — LOW (ref 42–52)
HGB BLD-MCNC: 13 G/DL — LOW (ref 14–18)
MCHC RBC-ENTMCNC: 30 PG — SIGNIFICANT CHANGE UP (ref 27–31)
MCHC RBC-ENTMCNC: 35.4 G/DL — SIGNIFICANT CHANGE UP (ref 32–36)
MCV RBC AUTO: 84.8 FL — SIGNIFICANT CHANGE UP (ref 80–94)
PLATELET # BLD AUTO: 304 K/UL — SIGNIFICANT CHANGE UP (ref 150–400)
POTASSIUM SERPL-MCNC: 3.8 MMOL/L — SIGNIFICANT CHANGE UP (ref 3.5–5.3)
POTASSIUM SERPL-SCNC: 3.8 MMOL/L — SIGNIFICANT CHANGE UP (ref 3.5–5.3)
RBC # BLD: 4.33 M/UL — LOW (ref 4.6–6.2)
RBC # FLD: 12.8 % — SIGNIFICANT CHANGE UP (ref 11–15.6)
SODIUM SERPL-SCNC: 134 MMOL/L — LOW (ref 135–145)
SPECIMEN SOURCE: SIGNIFICANT CHANGE UP
SPECIMEN SOURCE: SIGNIFICANT CHANGE UP
WBC # BLD: 10.3 K/UL — SIGNIFICANT CHANGE UP (ref 4.8–10.8)
WBC # FLD AUTO: 10.3 K/UL — SIGNIFICANT CHANGE UP (ref 4.8–10.8)

## 2017-04-22 PROCEDURE — 99233 SBSQ HOSP IP/OBS HIGH 50: CPT

## 2017-04-22 PROCEDURE — 70544 MR ANGIOGRAPHY HEAD W/O DYE: CPT | Mod: 26

## 2017-04-22 PROCEDURE — 70548 MR ANGIOGRAPHY NECK W/DYE: CPT | Mod: 26

## 2017-04-22 RX ADMIN — Medication 650 MILLIGRAM(S): at 07:20

## 2017-04-22 RX ADMIN — Medication 1 DROP(S): at 18:14

## 2017-04-22 RX ADMIN — DONEPEZIL HYDROCHLORIDE 10 MILLIGRAM(S): 10 TABLET, FILM COATED ORAL at 21:59

## 2017-04-22 RX ADMIN — RISPERIDONE 1 MILLIGRAM(S): 4 TABLET ORAL at 06:32

## 2017-04-22 RX ADMIN — HEPARIN SODIUM 5000 UNIT(S): 5000 INJECTION INTRAVENOUS; SUBCUTANEOUS at 06:32

## 2017-04-22 RX ADMIN — Medication 1 DROP(S): at 06:33

## 2017-04-22 RX ADMIN — AMLODIPINE BESYLATE 10 MILLIGRAM(S): 2.5 TABLET ORAL at 06:32

## 2017-04-22 RX ADMIN — Medication 650 MILLIGRAM(S): at 23:08

## 2017-04-22 RX ADMIN — Medication 650 MILLIGRAM(S): at 06:32

## 2017-04-22 RX ADMIN — Medication 2: at 16:48

## 2017-04-22 RX ADMIN — ATORVASTATIN CALCIUM 20 MILLIGRAM(S): 80 TABLET, FILM COATED ORAL at 21:59

## 2017-04-22 RX ADMIN — Medication 325 MILLIGRAM(S): at 12:50

## 2017-04-22 RX ADMIN — Medication 1: at 12:50

## 2017-04-22 RX ADMIN — Medication 2: at 08:43

## 2017-04-22 RX ADMIN — HEPARIN SODIUM 5000 UNIT(S): 5000 INJECTION INTRAVENOUS; SUBCUTANEOUS at 21:59

## 2017-04-22 RX ADMIN — BRIMONIDINE TARTRATE 1 DROP(S): 2 SOLUTION/ DROPS OPHTHALMIC at 18:14

## 2017-04-22 RX ADMIN — Medication 650 MILLIGRAM(S): at 21:59

## 2017-04-22 RX ADMIN — Medication 2: at 21:58

## 2017-04-22 RX ADMIN — BRIMONIDINE TARTRATE 1 DROP(S): 2 SOLUTION/ DROPS OPHTHALMIC at 06:33

## 2017-04-22 RX ADMIN — HEPARIN SODIUM 5000 UNIT(S): 5000 INJECTION INTRAVENOUS; SUBCUTANEOUS at 16:48

## 2017-04-22 RX ADMIN — RISPERIDONE 1 MILLIGRAM(S): 4 TABLET ORAL at 18:14

## 2017-04-22 NOTE — PROGRESS NOTE ADULT - SUBJECTIVE AND OBJECTIVE BOX
CC: Confusion, mental status changes from acute CVA is resolving.  MRI brain showing Acute punctate temporal lobe infarct .  Left hip pain- Hip xray showing no fracture or dislocation.    HPI:  73 years old male with PMH of DM, CVA, HTN and Dementia sent by PMD for evaluation of AMS. As per family at bedside, patient has been confused since Friday. Has been talking about old  friends and is having visual hallucinations. He also fell on Saturday. Fall was unwitnessed but son found him on floor around 4 am. As per son, there was no focal deficit so they did not take him to any Urgent Care or ER.  His oral intake and sleep have decreased. Family denies any fever, cough, urinary symptoms or sick contacts.   Patient is alert and awake and denies any headache, speech problem, arm/leg weakness, fever, cough, chest pain, palpitations or urinary symptoms. (17 Apr 2017 21:48)    REVIEW OF SYSTEMS:    Patient denied fever, chills, abdominal pain, nausea, vomiting, cough, shortness of breath, chest pain or palpitations    Vital Signs Last 24 Hrs  T(C): 36.7, Max: 36.9 (04-21 @ 12:12)  T(F): 98, Max: 98.4 (04-21 @ 12:12)  HR: 103 (76 - 103)  BP: 140/71 (108/51 - 140/71)  BP(mean): 91 (67 - 91)  RR: 98 (13 - 98)  SpO2: 100% (100% - 100%)I&O's Summary    I & Os for current day (as of 22 Apr 2017 12:05)  =============================================  IN: 0 ml / OUT: 1000 ml / NET: -1000 ml    PHYSICAL EXAM:  GENERAL: NAD, well-groomed  HEENT: PERRL, +EOMI, anicteric, no [place]  NECK: Supple, No JVD   CHEST/LUNG: bilateral rales   HEART: S1S2 Normal intensity, no murmurs, gallops or rubs noted  ABDOMEN: Soft, BS Normoactive, NT, ND, no HSM noted  EXTREMITIES:  2+ radial and DP pulses noted, no clubbing, cyanosis, or edema noted,  SKIN: No rashes or lesions noted  NEURO: A&Ox3,  , CN II-XII intact  PSYCH: depressed  mood and affect; insight/judgement inappropriate  LABS:                        13.0   10.3  )-----------( 304      ( 22 Apr 2017 08:33 )             36.7     04-22    134<L>  |  96<L>  |  13.0  ----------------------------<  252<H>  3.8   |  28.0  |  0.79    Ca    9.8      22 Apr 2017 08:33          RADIOLOGY & ADDITIONAL TESTS:    MEDICATIONS:  MEDICATIONS  (STANDING):  insulin lispro (HumaLOG) corrective regimen sliding scale  SubCutaneous Before meals and at bedtime  dextrose 5%. 1000milliLiter(s) IV Continuous <Continuous>  dextrose 50% Injectable 12.5Gram(s) IV Push once  dextrose 50% Injectable 25Gram(s) IV Push once  dextrose 50% Injectable 25Gram(s) IV Push once  aspirin 325milliGRAM(s) Oral daily  atorvastatin 20milliGRAM(s) Oral at bedtime  donepezil 10milliGRAM(s) Oral at bedtime  amLODIPine   Tablet 10milliGRAM(s) Oral daily  heparin  Injectable 5000Unit(s) SubCutaneous every 8 hours  timolol 0.5% Solution 1Drop(s) Both EYES every 12 hours  brimonidine 0.2% Ophthalmic Solution 1Drop(s) Both EYES every 12 hours  risperiDONE   Tablet 1milliGRAM(s) Oral two times a day  LORazepam   Injectable 1milliGRAM(s) IV Push once    MEDICATIONS  (PRN):  dextrose Gel 1Dose(s) Oral once PRN Blood Glucose LESS THAN 70 milliGRAM(s)/deciLiter  glucagon  Injectable 1milliGRAM(s) IntraMuscular once PRN Glucose <70 milliGRAM(s)/deciLiter  acetaminophen   Tablet. 650milliGRAM(s) Oral every 6 hours PRN Headache or Bodyache  haloperidol    Injectable 5milliGRAM(s) IntraMuscular every 6 hours PRN Agitation

## 2017-04-23 PROCEDURE — 99233 SBSQ HOSP IP/OBS HIGH 50: CPT

## 2017-04-23 RX ADMIN — HEPARIN SODIUM 5000 UNIT(S): 5000 INJECTION INTRAVENOUS; SUBCUTANEOUS at 05:50

## 2017-04-23 RX ADMIN — HEPARIN SODIUM 5000 UNIT(S): 5000 INJECTION INTRAVENOUS; SUBCUTANEOUS at 23:26

## 2017-04-23 RX ADMIN — Medication 1 DROP(S): at 05:51

## 2017-04-23 RX ADMIN — Medication 3: at 08:41

## 2017-04-23 RX ADMIN — HALOPERIDOL DECANOATE 5 MILLIGRAM(S): 100 INJECTION INTRAMUSCULAR at 22:10

## 2017-04-23 RX ADMIN — ATORVASTATIN CALCIUM 20 MILLIGRAM(S): 80 TABLET, FILM COATED ORAL at 23:26

## 2017-04-23 RX ADMIN — AMLODIPINE BESYLATE 10 MILLIGRAM(S): 2.5 TABLET ORAL at 05:50

## 2017-04-23 RX ADMIN — DONEPEZIL HYDROCHLORIDE 10 MILLIGRAM(S): 10 TABLET, FILM COATED ORAL at 23:26

## 2017-04-23 RX ADMIN — HEPARIN SODIUM 5000 UNIT(S): 5000 INJECTION INTRAVENOUS; SUBCUTANEOUS at 16:49

## 2017-04-23 RX ADMIN — Medication 4: at 16:49

## 2017-04-23 RX ADMIN — Medication 650 MILLIGRAM(S): at 05:50

## 2017-04-23 RX ADMIN — BRIMONIDINE TARTRATE 1 DROP(S): 2 SOLUTION/ DROPS OPHTHALMIC at 16:51

## 2017-04-23 RX ADMIN — Medication 650 MILLIGRAM(S): at 06:49

## 2017-04-23 RX ADMIN — Medication 1 DROP(S): at 16:51

## 2017-04-23 RX ADMIN — RISPERIDONE 1 MILLIGRAM(S): 4 TABLET ORAL at 05:50

## 2017-04-23 RX ADMIN — Medication 2: at 11:35

## 2017-04-23 RX ADMIN — Medication 325 MILLIGRAM(S): at 11:35

## 2017-04-23 RX ADMIN — BRIMONIDINE TARTRATE 1 DROP(S): 2 SOLUTION/ DROPS OPHTHALMIC at 05:51

## 2017-04-23 RX ADMIN — RISPERIDONE 1 MILLIGRAM(S): 4 TABLET ORAL at 16:52

## 2017-04-23 NOTE — PROGRESS NOTE ADULT - PROBLEM SELECTOR PLAN 2
Likely secondary to CVA.  Continue 1:1.  Continue current medications.
Risperdal 1mg bid   Aricept 10mg daily   Haldol 5mg q6 prn for agitation
Risperdal 1mg bid  Aricept 10mg daily
sp xray left hip: no acute fracture  pain control.
Likely secondary to CVA.  Continue 1:1.  Continue current medications.
manage delirium,  ruling out encephalopathy.

## 2017-04-23 NOTE — PROGRESS NOTE ADULT - SUBJECTIVE AND OBJECTIVE BOX
CC: Confusion . Tries to climb out of bed . Disorientated in place.  Head and neck mra are normal studies.   HPI:  73 years old male with PMH of DM, CVA, HTN and Dementia sent by PMD for evaluation of AMS. As per family at bedside, patient has been confused since Friday. Has been talking about old  friends and is having visual hallucinations. He also fell on Saturday. Fall was unwitnessed but son found him on floor around 4 am. As per son, there was no focal deficit so they did not take him to any Urgent Care or ER.  His oral intake and sleep have decreased. Family denies any fever, cough, urinary symptoms or sick contacts.   Patient is alert and awake and denies any headache, speech problem, arm/leg weakness, fever, cough, chest pain, palpitations or urinary symptoms. (17 Apr 2017 21:48)    REVIEW OF SYSTEMS:    Patient denied fever, chills, abdominal pain, nausea, vomiting, cough, shortness of breath, chest pain or palpitations    Vital Signs Last 24 Hrs  T(C): 36.9, Max: 36.9 (04-23 @ 04:31)  T(F): 98.4, Max: 98.5 (04-23 @ 04:31)  HR: 93 (81 - 108)  BP: 115/66 (104/90 - 156/87)  BP(mean): 81 (81 - 95)  RR: 29 (14 - 29)  SpO2: 98% (95% - 100%)I&O's Summary    I & Os for current day (as of 23 Apr 2017 13:02)  =============================================  IN: 660 ml / OUT: 740 ml / NET: -80 ml    PHYSICAL EXAM:  GENERAL: Confused   HEENT: PERRL, +EOMI, anicteric, no [place]  NECK: Supple, No JVD   CHEST/LUNG: CTA bilaterally; Normal effort  HEART: S1S2 Normal intensity, no murmurs, gallops or rubs noted  ABDOMEN: Soft, BS Normoactive, NT, ND, no HSM noted  EXTREMITIES:  2+ radial and DP pulses noted, no clubbing, cyanosis, or edema noted. Limitation of movement   SKIN: No rashes or lesions noted  NEURO: confuse disorientated, limitation of movement.  CN II-XII intact  PSYCH: normal mood and affect; insight/judgement appropriate  LABS:                        13.0   10.3  )-----------( 304      ( 22 Apr 2017 08:33 )             36.7     04-22    134<L>  |  96<L>  |  13.0  ----------------------------<  252<H>  3.8   |  28.0  |  0.79    Ca    9.8      22 Apr 2017 08:33          RADIOLOGY & ADDITIONAL TESTS:    MEDICATIONS:  MEDICATIONS  (STANDING):  insulin lispro (HumaLOG) corrective regimen sliding scale  SubCutaneous Before meals and at bedtime  dextrose 5%. 1000milliLiter(s) IV Continuous <Continuous>  dextrose 50% Injectable 12.5Gram(s) IV Push once  dextrose 50% Injectable 25Gram(s) IV Push once  dextrose 50% Injectable 25Gram(s) IV Push once  aspirin 325milliGRAM(s) Oral daily  atorvastatin 20milliGRAM(s) Oral at bedtime  donepezil 10milliGRAM(s) Oral at bedtime  amLODIPine   Tablet 10milliGRAM(s) Oral daily  heparin  Injectable 5000Unit(s) SubCutaneous every 8 hours  timolol 0.5% Solution 1Drop(s) Both EYES every 12 hours  brimonidine 0.2% Ophthalmic Solution 1Drop(s) Both EYES every 12 hours  risperiDONE   Tablet 1milliGRAM(s) Oral two times a day  LORazepam   Injectable 1milliGRAM(s) IV Push once    MEDICATIONS  (PRN):  dextrose Gel 1Dose(s) Oral once PRN Blood Glucose LESS THAN 70 milliGRAM(s)/deciLiter  glucagon  Injectable 1milliGRAM(s) IntraMuscular once PRN Glucose <70 milliGRAM(s)/deciLiter  acetaminophen   Tablet. 650milliGRAM(s) Oral every 6 hours PRN Headache or Bodyache  haloperidol    Injectable 5milliGRAM(s) IntraMuscular every 6 hours PRN Agitation

## 2017-04-23 NOTE — PROGRESS NOTE ADULT - PROBLEM SELECTOR PROBLEM 1
Cerebrovascular accident (CVA), unspecified mechanism
Altered mental status, unspecified
Cerebrovascular accident (CVA), unspecified mechanism

## 2017-04-23 NOTE — PROGRESS NOTE ADULT - PROBLEM SELECTOR PROBLEM 5
Acute renal failure, unspecified acute renal failure type
Diabetes
Mixed hyperlipidemia
Mixed hyperlipidemia
Acute renal failure, unspecified acute renal failure type

## 2017-04-23 NOTE — PROGRESS NOTE ADULT - PROBLEM SELECTOR PLAN 1
Aspirin 325mg daily  Atorvastatin 20mg daily    PT  Brain MRA, Neck MRA normal studies.  Patient is optimized for transfer to rehab [facility] or home rehab
Continue ASA and Statin.  Neurology and Cardiology following.  MRA Head and Neck ordered  PT eval.
Continue ASA and Statin.  Neurology and Cardiology noted  MRA Head and Neck ordered  PT eval.
Continue with antiplatelets regimen.  Await for MRA head/neck.  PT/OT/Rehab.  Speech/swallow eval.  DVT prophylaxis.  Will follow.
Right pontine stroke.    To obtain MRA cervical and brain and discharge if no now acute finding  PT-OT  Aspirin 325mg daily   Atorvastatin 20mg daily
Continue ASA and Statin.  Step down bed.  Neurology and Cardiology following.  MRA Head and Neck on hold until renal failure improves.  PT eval.  Speech and swallow eval.
delirium superimposed to dementia, ruling out encephalopathy.

## 2017-04-23 NOTE — PROGRESS NOTE ADULT - PROBLEM SELECTOR PROBLEM 4
Diabetes
Essential hypertension
Essential hypertension
Type 2 diabetes mellitus with other circulatory complication
Diabetes
Diabetes

## 2017-04-23 NOTE — PROGRESS NOTE ADULT - PROBLEM SELECTOR PROBLEM 3
Delirium superimposed on dementia
Essential hypertension
Essential hypertension
Type 2 diabetes mellitus with other circulatory complication
Essential hypertension
Hypertension

## 2017-04-23 NOTE — PROGRESS NOTE ADULT - PROBLEM SELECTOR PROBLEM 2
Delirium superimposed on dementia
Left thigh pain
Delirium superimposed on dementia
Dementia with behavioral disturbance, unspecified dementia type
Delirium superimposed on dementia
Delirium superimposed on dementia

## 2017-04-23 NOTE — PROGRESS NOTE ADULT - PROBLEM SELECTOR PLAN 4
Continue Norvasc with parameters.
Insulin sliding scale
Oral hypoglycemics on hold.  FS with sliding scale coverage.  HgbA1c 10.2
amlodipine 10mg alanna
Oral hypoglycemics on hold.  FS with sliding scale coverage.  Will check HgbA1c.
continue present care and insuline coverage.

## 2017-04-23 NOTE — DIETITIAN INITIAL EVALUATION ADULT. - OTHER INFO
Pt currently with good po intake per nsg. Foods being chopped up at bedside. SLP recommendations for [facility] soft diet noted to due dentition.

## 2017-04-23 NOTE — PROGRESS NOTE ADULT - PROBLEM SELECTOR PLAN 5
Oral hypoglycemics on hold.  FS with sliding scale coverage.  HgbA1c 10.2
Stain atovastatin as above
resolved.
statin as above
Continue IVF.  BMP stat today.

## 2017-04-23 NOTE — DIETITIAN INITIAL EVALUATION ADULT. - MD RECOMMEND
po supplement/change diet to [facility] soft, cho cons, dash/tlc

## 2017-04-23 NOTE — PROVIDER CONTACT NOTE (OTHER) - SITUATION
Pt chart reviewed, contents noted, it appears pt would benefit from a Physical Medicine and Rehabilitation Consult.

## 2017-04-24 VITALS — HEART RATE: 109 BPM

## 2017-04-24 PROCEDURE — 36415 COLL VENOUS BLD VENIPUNCTURE: CPT

## 2017-04-24 PROCEDURE — 97530 THERAPEUTIC ACTIVITIES: CPT

## 2017-04-24 PROCEDURE — 87040 BLOOD CULTURE FOR BACTERIA: CPT

## 2017-04-24 PROCEDURE — 70450 CT HEAD/BRAIN W/O DYE: CPT

## 2017-04-24 PROCEDURE — 97163 PT EVAL HIGH COMPLEX 45 MIN: CPT

## 2017-04-24 PROCEDURE — 93005 ELECTROCARDIOGRAM TRACING: CPT

## 2017-04-24 PROCEDURE — 85027 COMPLETE CBC AUTOMATED: CPT

## 2017-04-24 PROCEDURE — 86618 LYME DISEASE ANTIBODY: CPT

## 2017-04-24 PROCEDURE — 86592 SYPHILIS TEST NON-TREP QUAL: CPT

## 2017-04-24 PROCEDURE — 84425 ASSAY OF VITAMIN B-1: CPT

## 2017-04-24 PROCEDURE — 93880 EXTRACRANIAL BILAT STUDY: CPT

## 2017-04-24 PROCEDURE — 73552 X-RAY EXAM OF FEMUR 2/>: CPT

## 2017-04-24 PROCEDURE — 97110 THERAPEUTIC EXERCISES: CPT

## 2017-04-24 PROCEDURE — 83735 ASSAY OF MAGNESIUM: CPT

## 2017-04-24 PROCEDURE — 99222 1ST HOSP IP/OBS MODERATE 55: CPT

## 2017-04-24 PROCEDURE — 87086 URINE CULTURE/COLONY COUNT: CPT

## 2017-04-24 PROCEDURE — 71045 X-RAY EXAM CHEST 1 VIEW: CPT

## 2017-04-24 PROCEDURE — 99285 EMERGENCY DEPT VISIT HI MDM: CPT | Mod: 25

## 2017-04-24 PROCEDURE — 92610 EVALUATE SWALLOWING FUNCTION: CPT

## 2017-04-24 PROCEDURE — 97116 GAIT TRAINING THERAPY: CPT

## 2017-04-24 PROCEDURE — 84132 ASSAY OF SERUM POTASSIUM: CPT

## 2017-04-24 PROCEDURE — 83036 HEMOGLOBIN GLYCOSYLATED A1C: CPT

## 2017-04-24 PROCEDURE — 99239 HOSP IP/OBS DSCHRG MGMT >30: CPT

## 2017-04-24 PROCEDURE — 70548 MR ANGIOGRAPHY NECK W/DYE: CPT

## 2017-04-24 PROCEDURE — 83605 ASSAY OF LACTIC ACID: CPT

## 2017-04-24 PROCEDURE — 83880 ASSAY OF NATRIURETIC PEPTIDE: CPT

## 2017-04-24 PROCEDURE — 93306 TTE W/DOPPLER COMPLETE: CPT

## 2017-04-24 PROCEDURE — 70551 MRI BRAIN STEM W/O DYE: CPT

## 2017-04-24 PROCEDURE — 80053 COMPREHEN METABOLIC PANEL: CPT

## 2017-04-24 PROCEDURE — 80061 LIPID PANEL: CPT

## 2017-04-24 PROCEDURE — 81001 URINALYSIS AUTO W/SCOPE: CPT

## 2017-04-24 PROCEDURE — 80048 BASIC METABOLIC PNL TOTAL CA: CPT

## 2017-04-24 PROCEDURE — 84100 ASSAY OF PHOSPHORUS: CPT

## 2017-04-24 PROCEDURE — 84484 ASSAY OF TROPONIN QUANT: CPT

## 2017-04-24 PROCEDURE — 73502 X-RAY EXAM HIP UNI 2-3 VIEWS: CPT

## 2017-04-24 PROCEDURE — 82607 VITAMIN B-12: CPT

## 2017-04-24 PROCEDURE — 70544 MR ANGIOGRAPHY HEAD W/O DYE: CPT

## 2017-04-24 PROCEDURE — 84443 ASSAY THYROID STIM HORMONE: CPT

## 2017-04-24 RX ADMIN — Medication 1 DROP(S): at 05:33

## 2017-04-24 RX ADMIN — HEPARIN SODIUM 5000 UNIT(S): 5000 INJECTION INTRAVENOUS; SUBCUTANEOUS at 14:00

## 2017-04-24 RX ADMIN — RISPERIDONE 1 MILLIGRAM(S): 4 TABLET ORAL at 05:33

## 2017-04-24 RX ADMIN — Medication 1 DROP(S): at 17:12

## 2017-04-24 RX ADMIN — Medication: at 16:00

## 2017-04-24 RX ADMIN — HEPARIN SODIUM 5000 UNIT(S): 5000 INJECTION INTRAVENOUS; SUBCUTANEOUS at 05:33

## 2017-04-24 RX ADMIN — AMLODIPINE BESYLATE 10 MILLIGRAM(S): 2.5 TABLET ORAL at 05:33

## 2017-04-24 RX ADMIN — Medication: at 12:01

## 2017-04-24 RX ADMIN — BRIMONIDINE TARTRATE 1 DROP(S): 2 SOLUTION/ DROPS OPHTHALMIC at 05:33

## 2017-04-24 RX ADMIN — Medication 325 MILLIGRAM(S): at 12:35

## 2017-04-24 RX ADMIN — Medication: at 08:00

## 2017-04-24 RX ADMIN — RISPERIDONE 1 MILLIGRAM(S): 4 TABLET ORAL at 17:13

## 2017-04-24 RX ADMIN — BRIMONIDINE TARTRATE 1 DROP(S): 2 SOLUTION/ DROPS OPHTHALMIC at 17:12

## 2017-04-24 NOTE — CONSULT NOTE ADULT - SUBJECTIVE AND OBJECTIVE BOX
Patient is a 81y old  Male who presents for rehab evaluation s/p CVA       HPI:  73 years old male admitted 4/17 after presented per PMD request with 3 day history of confusion and fall 2 days prior to admission. He was talking about old  friends and was having visual hallucinations. Fall was unwitnessed but son found him on floor around 4 am. As per son, there was no focal deficit so they did not take him to any Urgent Care or ER.  His oral intake and sleep have decreased. Denied any headache, speech problem, arm/leg weakness, fever, cough, chest pain, palpitations or urinary symptoms. CT head showed chronic right occipital infarct. Carotid dopplers showed minimal b/l plaques. MRI showed acute punctate right temporal infarct. MRA Head/Neck were negative.      REVIEW OF SYSTEMS  Constitutional - No fever, No weight loss, No fatigue  HEENT - No eye pain, No visual disturbances, No difficulty hearing, No tinnitus, No vertigo, No neck pain  Respiratory - No cough, No wheezing, No shortness of breath  Cardiovascular - No chest pain, No palpitations  Gastrointestinal - No abdominal pain, No nausea, No vomiting, No diarrhea, No constipation  Genitourinary - No dysuria, No frequency, No hematuria, No incontinence  Neurological - No headaches, No memory loss, No loss of strength, No numbness, No tremors  Skin - No itching, No rashes, No lesions   Endocrine - No temperature intolerance  Musculoskeletal - No joint pain, No joint swelling, No muscle pain  Psychiatric - No depression, No anxiety    PAST MEDICAL & SURGICAL HISTORY  CVA (cerebral vascular accident)  Hyperlipemia  Diabetes  Hypertension  Dementia      SOCIAL HISTORY  Smoking - Denied  EtOH - Denied   Drugs - Denied    FUNCTIONAL HISTORY  Lives with son in home with 4STE and no hand rails.  Occasional used SAC for ambulation and required assist with ADL's    CURRENT FUNCTIONAL STATUS PT Eval 4/24  Max A bed mobility  -Mod A transfers  Unable to take steps this am    FAMILY HISTORY   No pertinent family history in first degree relatives      RECENT LABS/IMAGING  None    VITALS  T(C): 36.7, Max: 37.1 (04-23 @ 15:34)  HR: 116 (92 - 116)  BP: 98/34 (98/34 - 151/83)  RR: 21 (18 - 21)  SpO2: 97% (95% - 100%)      ALLERGIES  No Known Allergies      MEDICATIONS   insulin lispro (HumaLOG) corrective regimen sliding scale  SubCutaneous Before meals and at bedtime  dextrose 5%. 1000milliLiter(s) IV Continuous <Continuous>  dextrose Gel 1Dose(s) Oral once PRN  dextrose 50% Injectable 12.5Gram(s) IV Push once  dextrose 50% Injectable 25Gram(s) IV Push once  dextrose 50% Injectable 25Gram(s) IV Push once  glucagon  Injectable 1milliGRAM(s) IntraMuscular once PRN  aspirin 325milliGRAM(s) Oral daily  atorvastatin 20milliGRAM(s) Oral at bedtime  donepezil 10milliGRAM(s) Oral at bedtime  amLODIPine   Tablet 10milliGRAM(s) Oral daily  heparin  Injectable 5000Unit(s) SubCutaneous every 8 hours  timolol 0.5% Solution 1Drop(s) Both EYES every 12 hours  brimonidine 0.2% Ophthalmic Solution 1Drop(s) Both EYES every 12 hours  acetaminophen   Tablet. 650milliGRAM(s) Oral every 6 hours PRN  risperiDONE   Tablet 1milliGRAM(s) Oral two times a day  haloperidol    Injectable 5milliGRAM(s) IntraMuscular every 6 hours PRN Patient is a 81y old  Male who presents for rehab evaluation s/p CVA       HPI:  73 years old male admitted 4/17 after presented per PMD request with 3 day history of confusion and fall 2 days prior to admission. He was talking about old  friends and was having visual hallucinations. Fall was unwitnessed but son found him on floor around 4 am. As per son, there was no focal deficit so they did not take him to any Urgent Care or ER. His oral intake and sleep have decreased. Denied any headache, speech problem, arm/leg weakness, fever, cough, chest pain, palpitations or urinary symptoms. CT head showed chronic right occipital infarct. Carotid dopplers showed minimal b/l plaques. MRI showed acute punctate right temporal infarct. MRA Head/Neck were negative.      REVIEW OF SYSTEMS  Constitutional - No fever, No weight loss, No fatigue  HEENT - No eye pain, No visual disturbances, No difficulty hearing, No tinnitus, No vertigo, No neck pain  Respiratory - No cough, No wheezing, No shortness of breath  Cardiovascular - No chest pain, No palpitations  Gastrointestinal - No abdominal pain, No nausea, No vomiting, No diarrhea, No constipation  Genitourinary - No dysuria, No frequency, No hematuria, No incontinence  Neurological - No headaches, + memory loss, + loss of strength, No numbness, No tremors  Skin - No itching, No rashes, No lesions   Endocrine - No temperature intolerance  Musculoskeletal - No joint pain, No joint swelling, No muscle pain  Psychiatric - No depression, No anxiety    PAST MEDICAL & SURGICAL HISTORY  CVA (cerebral vascular accident)  Hyperlipemia  Diabetes  Hypertension  Dementia      SOCIAL HISTORY  Smoking - Denied  EtOH - Denied   Drugs - Denied    FUNCTIONAL HISTORY  Lives with son in home with 4STE and no hand rails.  Occasional used SAC for ambulation and required assist with ADL's    CURRENT FUNCTIONAL STATUS PT Eval 4/24  Max A bed mobility  Mod A transfers  Unable to take steps this am    FAMILY HISTORY   No pertinent family history in first degree relatives      RECENT LABS/IMAGING  None    VITALS  T(C): 36.7, Max: 37.1 (04-23 @ 15:34)  HR: 116 (92 - 116)  BP: 98/34 (98/34 - 151/83)  RR: 21 (18 - 21)  SpO2: 97% (95% - 100%)      ALLERGIES  No Known Allergies      MEDICATIONS   insulin lispro (HumaLOG) corrective regimen sliding scale  SubCutaneous Before meals and at bedtime  dextrose 5%. 1000milliLiter(s) IV Continuous <Continuous>  dextrose Gel 1Dose(s) Oral once PRN  dextrose 50% Injectable 12.5Gram(s) IV Push once  dextrose 50% Injectable 25Gram(s) IV Push once  dextrose 50% Injectable 25Gram(s) IV Push once  glucagon  Injectable 1milliGRAM(s) IntraMuscular once PRN  aspirin 325milliGRAM(s) Oral daily  atorvastatin 20milliGRAM(s) Oral at bedtime  donepezil 10milliGRAM(s) Oral at bedtime  amLODIPine   Tablet 10milliGRAM(s) Oral daily  heparin  Injectable 5000Unit(s) SubCutaneous every 8 hours  timolol 0.5% Solution 1Drop(s) Both EYES every 12 hours  brimonidine 0.2% Ophthalmic Solution 1Drop(s) Both EYES every 12 hours  acetaminophen   Tablet. 650milliGRAM(s) Oral every 6 hours PRN  risperiDONE   Tablet 1milliGRAM(s) Oral two times a day  haloperidol    Injectable 5milliGRAM(s) IntraMuscular every 6 hours PRN    ----------------------------------------------------------------------------------------  PHYSICAL EXAM- limited secondary to patient's dementia and inability to consistently follow commands  Constitutional - NAD, Comfortable  HEENT - NCAT, poor dentition  Neck - Supple, No limited ROM  Chest - CTA bilaterally, No wheeze, No rhonchi, No crackles  Cardiovascular - RRR, S1S2, No murmurs  Abdomen - BS+, Soft, NTND  Extremities - No C/C/E   Neurologic Exam -                    Cognitive - Awake, Confused     Communication - Some dysarthria and aphasia     Cranial Nerves - unable to fully assess secondary to mental status, left hank-neglect     Motor - Left hemiplegia, moves RUE and LUE spontaneously     Sensory - unable to accurately assess secondary to mental status     Coordination - unable to test secondary to mental status     Psychiatric - Mood stable, Affect WNL  ----------------------------------------------------------------------------------------  ASSESSMENT/PLAN    74 yo M with acute right temporal infarct and left hank-neglect and hemiplegia now with worsened functional deficits.    DVT PPx- Heparin SQ   Precautions- Fall, left hank-neglect    - Recommend [facility], patient DOES NOT meet acute inpatient rehabilitation criteria

## 2017-04-24 NOTE — CONSULT NOTE ADULT - ATTENDING COMMENTS
Chart reviewed. History from chart as patient unable to provide history.  73 year old male with h/o prior CVA, dementia admitted after fall, confusion and w/u revealing acute right temporal infarct.   Patient with left hemiplegia, left inattention, possible anosognosia.   Not appropriate for acute rehab.   May be more appropriate for [facility] program to help with some functional recovery. Thank you

## 2018-04-13 NOTE — PHYSICAL THERAPY INITIAL EVALUATION ADULT - LEVEL OF INDEPENDENCE: GAIT, REHAB EVAL
Meghna notified by phone.    I advised her that if symptoms worsen over the weekend or he develops abdominal pain or increased nausea and vomiting then she should take him to ER.    She verbalized understanding and agreed with plan.    RANJEET Crow, RN, [facility]) [phone]       minimum assist (75% patients effort)

## 2018-12-04 NOTE — DIETITIAN INITIAL EVALUATION ADULT. - WEIGHT IN LBS
Telephone Encounter by Jennifer Dial MA at 07/20/18 06:52 AM     Author:  Jennifer Dial MA Service:  (none) Author Type:  Medical Assistant     Filed:  07/20/18 06:52 AM Encounter Date:  7/19/2018 Status:  Signed     :  Jennifer Dial MA (Medical Assistant)            Fwd to [facility] OS[AZ1.1M]          Revision History        User Key Date/Time User Provider Type Action    > AZ1.1 07/20/18 06:52 AM Jennifer Dial MA Medical Assistant Sign    M - Manual             151

## 2020-02-03 NOTE — DISCHARGE NOTE ADULT - CARE PROVIDER_API CALL
Schedule SIBO test     Lactulose-Hydrogen Breath (SIBO) Test Instructions  CPT Code 84406      Date: 02/13/20    Appointment Time: 8:15 AM    Location: [facility], [address], [address] (use [facility] entrance)    This test cannot be performed if you are experiencing any acute illness such as the flu or respiratory infections.     You should anticipate being in the office for about 2 hours. Please feel free to bring reading materials or small electronics. [employer] is available for use.     WAIT 4 WEEKS from a colonoscopy or barium enema procedure to have this test     2 WEEKS PRIOR TO YOUR TEST:  Do not take any antibiotics, antifungal medications, or Pepto-Bismol (if you require antibiotics or an antifungal medication for an infection, please call our office to reschedule)     7 DAYS PRIOR TO YOUR TEST:  Avoid the use of laxatives , stool, softners, stool bulking agents, or antacids that contain aluminum or magnesium hydroxide (E-Lax, Colace, Metamucil, Fibercon, Maalox, Milk of Magnesia, Rolaids, Mylanta).     2 DAYS PRIOR TO YOUR TEST: Stop eating high-fiber and lactose-containing foods (dairy products).     1 DAY PRIOR TO YOUR TEST:   You may have all meats & poultry (chicken, turkey, beef, ham, lamb, pork, salami, santos), shellfish, fish, eggs, bean sprouts, celery, cucumber, boston lettuce, iceberg lettuce, leaf lettuce, green peppers, red peppers, yellow peppers, chili peppers, radishes, fruit or vegetable juices, shertbert (must not contain cream), margarine (no butter), salad and cooking oils, coffee, tea (no creamer), condiments such as ketchup, mustard, pickle relish, soy sauce     You can have the following foods, however please eat them sparingly prior to this test:   White bread, white crackers, corn flakes, puffed rice, cheerios, rice krispies, cream of wheat, tofu, plain white rice, popcorn (no butter), avocado, mushrooms, olives, onion, parsley, asparagus, beets, cabbage, cauliflower, parsley,  cilantro, yellow squash, cantaloupe, honeydew melon, pineapple, grapefruit, grapes, melons, skinned peaches, plums, or apricots, skinned tomatoes, and watermelon.           Please avoid the following foods:  All beans, soybeans, soy products, whole wheat, whole grain, and high fiber products including rye, oats, buckwheat, cornmeal, corn products, wild rice, brown rice, basmati rice, green beans, lima beans, broccoli, yams, white or sweet potatoes, peas, dark green leafy vegetables, brussel sprouts, carrots, artichoke, eggplant, and squash, dried fruit, nuts, seeds, and all dairy products    Do not take any probiotics 24 hours before your test.    12 HOURS BEFORE YOUR TEST: Do not have anything to eat 12 hours prior to your scheduled test. You may only have water. You may not chew gum, eat candy, or use mouthwash until the test is completed.     Do not take non-essential medications until your test is completed unless otherwise directed by your physician.    1 HOUR BEFORE YOUR TEST: Please do not exercise, smoke, or sleep.      If you have any questions regarding these instructions, please call our office at [phone]          As a friendly reminder to all our patients, the Gastroenterology department respects all our patients' time and we do our best to see you at your scheduled appointment time. Therefore, please arrive 15 minutes prior to your appointment in order to complete check-in and the rooming process. Please also note, if you are more than 5 minutes late for your appointment, you may be rescheduled.      Wilber Rosales), Family Medicine  [address] [place] [address]  Phone: [phone]  Fax: [phone]
